# Patient Record
Sex: FEMALE | Race: WHITE | NOT HISPANIC OR LATINO | Employment: FULL TIME | URBAN - METROPOLITAN AREA
[De-identification: names, ages, dates, MRNs, and addresses within clinical notes are randomized per-mention and may not be internally consistent; named-entity substitution may affect disease eponyms.]

---

## 2019-10-15 ENCOUNTER — TELEPHONE (OUTPATIENT)
Dept: INTERNAL MEDICINE | Facility: CLINIC | Age: 24
End: 2019-10-15

## 2019-10-15 NOTE — TELEPHONE ENCOUNTER
Patient states she is a patient of  but is not wanting to schedule an appointment with him at this time to re-establish care, only wants to schedule a flu shot. Patient has scheduled a flu shot and will call back to schedule appointment with .

## 2019-10-15 NOTE — TELEPHONE ENCOUNTER
----- Message from Catia Espinoza sent at 10/15/2019 12:42 PM CDT -----  Contact: pt  Pt would like nurse to contact her regarding an appointment at ([phone])    Pt will be NEW PT!

## 2021-04-05 ENCOUNTER — LAB VISIT (OUTPATIENT)
Dept: LAB | Facility: HOSPITAL | Age: 26
End: 2021-04-05
Attending: FAMILY MEDICINE
Payer: COMMERCIAL

## 2021-04-05 ENCOUNTER — OFFICE VISIT (OUTPATIENT)
Dept: INTERNAL MEDICINE | Facility: CLINIC | Age: 26
End: 2021-04-05
Payer: COMMERCIAL

## 2021-04-05 VITALS
DIASTOLIC BLOOD PRESSURE: 70 MMHG | TEMPERATURE: 99 F | WEIGHT: 128.06 LBS | SYSTOLIC BLOOD PRESSURE: 100 MMHG | HEIGHT: 66 IN | HEART RATE: 53 BPM | OXYGEN SATURATION: 100 % | BODY MASS INDEX: 20.58 KG/M2

## 2021-04-05 DIAGNOSIS — Z76.89 ENCOUNTER TO ESTABLISH CARE: Primary | ICD-10-CM

## 2021-04-05 DIAGNOSIS — Z76.89 ENCOUNTER TO ESTABLISH CARE: ICD-10-CM

## 2021-04-05 DIAGNOSIS — Z87.898 HISTORY OF SEIZURE: ICD-10-CM

## 2021-04-05 DIAGNOSIS — T07.XXXA MULTIPLE EXCORIATIONS: ICD-10-CM

## 2021-04-05 LAB
25(OH)D3+25(OH)D2 SERPL-MCNC: 31 NG/ML (ref 30–96)
ALBUMIN SERPL BCP-MCNC: 4.3 G/DL (ref 3.5–5.2)
ALP SERPL-CCNC: 62 U/L (ref 55–135)
ALT SERPL W/O P-5'-P-CCNC: 14 U/L (ref 10–44)
ANION GAP SERPL CALC-SCNC: 6 MMOL/L (ref 8–16)
AST SERPL-CCNC: 20 U/L (ref 10–40)
BASOPHILS # BLD AUTO: 0.08 K/UL (ref 0–0.2)
BASOPHILS NFR BLD: 1.3 % (ref 0–1.9)
BILIRUB SERPL-MCNC: 1.2 MG/DL (ref 0.1–1)
BUN SERPL-MCNC: 12 MG/DL (ref 6–20)
CALCIUM SERPL-MCNC: 9.4 MG/DL (ref 8.7–10.5)
CHLORIDE SERPL-SCNC: 104 MMOL/L (ref 95–110)
CHOLEST SERPL-MCNC: 166 MG/DL (ref 120–199)
CHOLEST/HDLC SERPL: 2.3 {RATIO} (ref 2–5)
CO2 SERPL-SCNC: 28 MMOL/L (ref 23–29)
CREAT SERPL-MCNC: 0.8 MG/DL (ref 0.5–1.4)
DIFFERENTIAL METHOD: NORMAL
EOSINOPHIL # BLD AUTO: 0.1 K/UL (ref 0–0.5)
EOSINOPHIL NFR BLD: 2 % (ref 0–8)
ERYTHROCYTE [DISTWIDTH] IN BLOOD BY AUTOMATED COUNT: 12.4 % (ref 11.5–14.5)
EST. GFR  (AFRICAN AMERICAN): >60 ML/MIN/1.73 M^2
EST. GFR  (NON AFRICAN AMERICAN): >60 ML/MIN/1.73 M^2
GLUCOSE SERPL-MCNC: 87 MG/DL (ref 70–110)
HCT VFR BLD AUTO: 41.5 % (ref 37–48.5)
HDLC SERPL-MCNC: 71 MG/DL (ref 40–75)
HDLC SERPL: 42.8 % (ref 20–50)
HGB BLD-MCNC: 13.8 G/DL (ref 12–16)
IMM GRANULOCYTES # BLD AUTO: 0.02 K/UL (ref 0–0.04)
IMM GRANULOCYTES NFR BLD AUTO: 0.3 % (ref 0–0.5)
LDLC SERPL CALC-MCNC: 83.2 MG/DL (ref 63–159)
LYMPHOCYTES # BLD AUTO: 1.2 K/UL (ref 1–4.8)
LYMPHOCYTES NFR BLD: 19.2 % (ref 18–48)
MCH RBC QN AUTO: 29.9 PG (ref 27–31)
MCHC RBC AUTO-ENTMCNC: 33.3 G/DL (ref 32–36)
MCV RBC AUTO: 90 FL (ref 82–98)
MONOCYTES # BLD AUTO: 0.5 K/UL (ref 0.3–1)
MONOCYTES NFR BLD: 7.5 % (ref 4–15)
NEUTROPHILS # BLD AUTO: 4.3 K/UL (ref 1.8–7.7)
NEUTROPHILS NFR BLD: 69.7 % (ref 38–73)
NONHDLC SERPL-MCNC: 95 MG/DL
NRBC BLD-RTO: 0 /100 WBC
PLATELET # BLD AUTO: 310 K/UL (ref 150–450)
PMV BLD AUTO: 10.9 FL (ref 9.2–12.9)
POTASSIUM SERPL-SCNC: 3.7 MMOL/L (ref 3.5–5.1)
PROT SERPL-MCNC: 7.2 G/DL (ref 6–8.4)
RBC # BLD AUTO: 4.62 M/UL (ref 4–5.4)
SODIUM SERPL-SCNC: 138 MMOL/L (ref 136–145)
TRIGL SERPL-MCNC: 59 MG/DL (ref 30–150)
TSH SERPL DL<=0.005 MIU/L-ACNC: 1.4 UIU/ML (ref 0.4–4)
WBC # BLD AUTO: 6.13 K/UL (ref 3.9–12.7)

## 2021-04-05 PROCEDURE — 1126F AMNT PAIN NOTED NONE PRSNT: CPT | Mod: S$GLB,,, | Performed by: FAMILY MEDICINE

## 2021-04-05 PROCEDURE — 3008F PR BODY MASS INDEX (BMI) DOCUMENTED: ICD-10-PCS | Mod: CPTII,S$GLB,, | Performed by: FAMILY MEDICINE

## 2021-04-05 PROCEDURE — 80053 COMPREHEN METABOLIC PANEL: CPT | Performed by: FAMILY MEDICINE

## 2021-04-05 PROCEDURE — 86703 HIV-1/HIV-2 1 RESULT ANTBDY: CPT | Performed by: FAMILY MEDICINE

## 2021-04-05 PROCEDURE — 85025 COMPLETE CBC W/AUTO DIFF WBC: CPT | Performed by: FAMILY MEDICINE

## 2021-04-05 PROCEDURE — 36415 COLL VENOUS BLD VENIPUNCTURE: CPT | Performed by: FAMILY MEDICINE

## 2021-04-05 PROCEDURE — 99203 PR OFFICE/OUTPT VISIT, NEW, LEVL III, 30-44 MIN: ICD-10-PCS | Mod: S$GLB,,, | Performed by: FAMILY MEDICINE

## 2021-04-05 PROCEDURE — 82306 VITAMIN D 25 HYDROXY: CPT | Performed by: FAMILY MEDICINE

## 2021-04-05 PROCEDURE — 86803 HEPATITIS C AB TEST: CPT | Performed by: FAMILY MEDICINE

## 2021-04-05 PROCEDURE — 80061 LIPID PANEL: CPT | Performed by: FAMILY MEDICINE

## 2021-04-05 PROCEDURE — 99999 PR PBB SHADOW E&M-EST. PATIENT-LVL IV: ICD-10-PCS | Mod: PBBFAC,,, | Performed by: FAMILY MEDICINE

## 2021-04-05 PROCEDURE — 1126F PR PAIN SEVERITY QUANTIFIED, NO PAIN PRESENT: ICD-10-PCS | Mod: S$GLB,,, | Performed by: FAMILY MEDICINE

## 2021-04-05 PROCEDURE — 3008F BODY MASS INDEX DOCD: CPT | Mod: CPTII,S$GLB,, | Performed by: FAMILY MEDICINE

## 2021-04-05 PROCEDURE — 99203 OFFICE O/P NEW LOW 30 MIN: CPT | Mod: S$GLB,,, | Performed by: FAMILY MEDICINE

## 2021-04-05 PROCEDURE — 99999 PR PBB SHADOW E&M-EST. PATIENT-LVL IV: CPT | Mod: PBBFAC,,, | Performed by: FAMILY MEDICINE

## 2021-04-05 PROCEDURE — 84443 ASSAY THYROID STIM HORMONE: CPT | Performed by: FAMILY MEDICINE

## 2021-04-05 RX ORDER — OXCARBAZEPINE 600 MG/1
1 TABLET ORAL
COMMUNITY
Start: 2021-02-03

## 2021-04-06 LAB
HCV AB SERPL QL IA: NEGATIVE
HIV 1+2 AB+HIV1 P24 AG SERPL QL IA: NEGATIVE

## 2021-04-20 ENCOUNTER — OFFICE VISIT (OUTPATIENT)
Dept: DERMATOLOGY | Facility: CLINIC | Age: 26
End: 2021-04-20
Payer: COMMERCIAL

## 2021-04-20 ENCOUNTER — PATIENT OUTREACH (OUTPATIENT)
Dept: ADMINISTRATIVE | Facility: HOSPITAL | Age: 26
End: 2021-04-20

## 2021-04-20 DIAGNOSIS — T07.XXXA MULTIPLE EXCORIATIONS: ICD-10-CM

## 2021-04-20 DIAGNOSIS — L70.5 ACNE EXCORIEE: ICD-10-CM

## 2021-04-20 DIAGNOSIS — L70.0 ACNE VULGARIS: Primary | ICD-10-CM

## 2021-04-20 PROCEDURE — 99204 PR OFFICE/OUTPT VISIT, NEW, LEVL IV, 45-59 MIN: ICD-10-PCS | Mod: S$GLB,,, | Performed by: DERMATOLOGY

## 2021-04-20 PROCEDURE — 99999 PR PBB SHADOW E&M-EST. PATIENT-LVL III: CPT | Mod: PBBFAC,,, | Performed by: DERMATOLOGY

## 2021-04-20 PROCEDURE — 99204 OFFICE O/P NEW MOD 45 MIN: CPT | Mod: S$GLB,,, | Performed by: DERMATOLOGY

## 2021-04-20 PROCEDURE — 99999 PR PBB SHADOW E&M-EST. PATIENT-LVL III: ICD-10-PCS | Mod: PBBFAC,,, | Performed by: DERMATOLOGY

## 2021-04-20 RX ORDER — TRETINOIN 0.25 MG/G
CREAM TOPICAL NIGHTLY
Qty: 20 G | Refills: 2 | Status: SHIPPED | OUTPATIENT
Start: 2021-04-20 | End: 2021-08-24 | Stop reason: SDUPTHER

## 2021-04-29 ENCOUNTER — PATIENT OUTREACH (OUTPATIENT)
Dept: ADMINISTRATIVE | Facility: HOSPITAL | Age: 26
End: 2021-04-29

## 2021-08-16 ENCOUNTER — TELEPHONE (OUTPATIENT)
Dept: DERMATOLOGY | Facility: CLINIC | Age: 26
End: 2021-08-16

## 2021-08-24 ENCOUNTER — OFFICE VISIT (OUTPATIENT)
Dept: DERMATOLOGY | Facility: CLINIC | Age: 26
End: 2021-08-24
Payer: COMMERCIAL

## 2021-08-24 DIAGNOSIS — L73.0 ACNE SCARRING: ICD-10-CM

## 2021-08-24 DIAGNOSIS — L70.0 ACNE VULGARIS: Primary | ICD-10-CM

## 2021-08-24 DIAGNOSIS — L81.9 POST-INFLAMMATORY PIGMENTARY CHANGES: ICD-10-CM

## 2021-08-24 DIAGNOSIS — L70.5 ACNE EXCORIEE: ICD-10-CM

## 2021-08-24 PROCEDURE — 1159F MED LIST DOCD IN RCRD: CPT | Mod: CPTII,S$GLB,, | Performed by: DERMATOLOGY

## 2021-08-24 PROCEDURE — 99214 PR OFFICE/OUTPT VISIT, EST, LEVL IV, 30-39 MIN: ICD-10-PCS | Mod: S$GLB,,, | Performed by: DERMATOLOGY

## 2021-08-24 PROCEDURE — 99999 PR PBB SHADOW E&M-EST. PATIENT-LVL III: CPT | Mod: PBBFAC,,, | Performed by: DERMATOLOGY

## 2021-08-24 PROCEDURE — 1160F RVW MEDS BY RX/DR IN RCRD: CPT | Mod: CPTII,S$GLB,, | Performed by: DERMATOLOGY

## 2021-08-24 PROCEDURE — 1160F PR REVIEW ALL MEDS BY PRESCRIBER/CLIN PHARMACIST DOCUMENTED: ICD-10-PCS | Mod: CPTII,S$GLB,, | Performed by: DERMATOLOGY

## 2021-08-24 PROCEDURE — 99214 OFFICE O/P EST MOD 30 MIN: CPT | Mod: S$GLB,,, | Performed by: DERMATOLOGY

## 2021-08-24 PROCEDURE — 99999 PR PBB SHADOW E&M-EST. PATIENT-LVL III: ICD-10-PCS | Mod: PBBFAC,,, | Performed by: DERMATOLOGY

## 2021-08-24 PROCEDURE — 1159F PR MEDICATION LIST DOCUMENTED IN MEDICAL RECORD: ICD-10-PCS | Mod: CPTII,S$GLB,, | Performed by: DERMATOLOGY

## 2021-08-24 RX ORDER — SULFACETAMIDE SODIUM, SULFUR 100; 50 MG/G; MG/G
EMULSION TOPICAL
Qty: 360 G | Refills: 3 | Status: SHIPPED | OUTPATIENT
Start: 2021-08-24 | End: 2022-06-02 | Stop reason: SDUPTHER

## 2021-08-24 RX ORDER — TRETINOIN 0.25 MG/G
CREAM TOPICAL NIGHTLY
Qty: 20 G | Refills: 5 | Status: SHIPPED | OUTPATIENT
Start: 2021-08-24 | End: 2022-06-02 | Stop reason: SDUPTHER

## 2021-09-04 ENCOUNTER — PATIENT MESSAGE (OUTPATIENT)
Dept: INTERNAL MEDICINE | Facility: CLINIC | Age: 26
End: 2021-09-04

## 2021-12-01 ENCOUNTER — OFFICE VISIT (OUTPATIENT)
Dept: DERMATOLOGY | Facility: CLINIC | Age: 26
End: 2021-12-01
Payer: COMMERCIAL

## 2021-12-01 DIAGNOSIS — L81.0 POSTINFLAMMATORY HYPERPIGMENTATION: ICD-10-CM

## 2021-12-01 DIAGNOSIS — L73.0 ACNE SCARRING: Primary | ICD-10-CM

## 2021-12-01 PROCEDURE — 99213 OFFICE O/P EST LOW 20 MIN: CPT | Mod: S$GLB,,, | Performed by: STUDENT IN AN ORGANIZED HEALTH CARE EDUCATION/TRAINING PROGRAM

## 2021-12-01 PROCEDURE — 99213 PR OFFICE/OUTPT VISIT, EST, LEVL III, 20-29 MIN: ICD-10-PCS | Mod: S$GLB,,, | Performed by: STUDENT IN AN ORGANIZED HEALTH CARE EDUCATION/TRAINING PROGRAM

## 2021-12-01 PROCEDURE — 99999 PR PBB SHADOW E&M-EST. PATIENT-LVL III: ICD-10-PCS | Mod: PBBFAC,,, | Performed by: STUDENT IN AN ORGANIZED HEALTH CARE EDUCATION/TRAINING PROGRAM

## 2021-12-01 PROCEDURE — 99999 PR PBB SHADOW E&M-EST. PATIENT-LVL III: CPT | Mod: PBBFAC,,, | Performed by: STUDENT IN AN ORGANIZED HEALTH CARE EDUCATION/TRAINING PROGRAM

## 2021-12-08 ENCOUNTER — OFFICE VISIT (OUTPATIENT)
Dept: INTERNAL MEDICINE | Facility: CLINIC | Age: 26
End: 2021-12-08
Payer: COMMERCIAL

## 2021-12-08 VITALS
OXYGEN SATURATION: 100 % | HEIGHT: 66 IN | BODY MASS INDEX: 20.76 KG/M2 | SYSTOLIC BLOOD PRESSURE: 104 MMHG | HEART RATE: 81 BPM | DIASTOLIC BLOOD PRESSURE: 70 MMHG | WEIGHT: 129.19 LBS | TEMPERATURE: 96 F

## 2021-12-08 DIAGNOSIS — F41.9 ANXIETY: Primary | ICD-10-CM

## 2021-12-08 PROCEDURE — 99999 PR PBB SHADOW E&M-EST. PATIENT-LVL III: CPT | Mod: PBBFAC,,, | Performed by: FAMILY MEDICINE

## 2021-12-08 PROCEDURE — 99999 PR PBB SHADOW E&M-EST. PATIENT-LVL III: ICD-10-PCS | Mod: PBBFAC,,, | Performed by: FAMILY MEDICINE

## 2021-12-08 PROCEDURE — 99213 OFFICE O/P EST LOW 20 MIN: CPT | Mod: S$GLB,,, | Performed by: FAMILY MEDICINE

## 2021-12-08 PROCEDURE — 99213 PR OFFICE/OUTPT VISIT, EST, LEVL III, 20-29 MIN: ICD-10-PCS | Mod: S$GLB,,, | Performed by: FAMILY MEDICINE

## 2021-12-08 RX ORDER — ESCITALOPRAM OXALATE 10 MG/1
10 TABLET ORAL DAILY
Qty: 90 TABLET | Refills: 1 | Status: SHIPPED | OUTPATIENT
Start: 2021-12-08 | End: 2022-02-01 | Stop reason: DRUGHIGH

## 2022-02-01 ENCOUNTER — OFFICE VISIT (OUTPATIENT)
Dept: INTERNAL MEDICINE | Facility: CLINIC | Age: 27
End: 2022-02-01
Payer: COMMERCIAL

## 2022-02-01 DIAGNOSIS — F41.9 ANXIETY: Primary | ICD-10-CM

## 2022-02-01 PROCEDURE — 99213 PR OFFICE/OUTPT VISIT, EST, LEVL III, 20-29 MIN: ICD-10-PCS | Mod: 95,,, | Performed by: FAMILY MEDICINE

## 2022-02-01 PROCEDURE — 99213 OFFICE O/P EST LOW 20 MIN: CPT | Mod: 95,,, | Performed by: FAMILY MEDICINE

## 2022-02-01 RX ORDER — ESCITALOPRAM OXALATE 20 MG/1
20 TABLET ORAL DAILY
Qty: 90 TABLET | Refills: 1 | Status: SHIPPED | OUTPATIENT
Start: 2022-02-01 | End: 2022-08-01

## 2022-02-01 NOTE — PROGRESS NOTES
Subjective:       Patient ID: Tammie Siddiqui is a 26 y.o. female.    Chief Complaint: No chief complaint on file.    HPI    The patient location is: home  The chief complaint leading to consultation is: anxiety    Visit type: audiovisual    Face to Face time with patient: 10   minutes of total time spent on the encounter, which includes face to face time and non-face to face time preparing to see the patient (eg, review of tests), Obtaining and/or reviewing separately obtained history, Documenting clinical information in the electronic or other health record, Independently interpreting results (not separately reported) and communicating results to the patient/family/caregiver, or Care coordination (not separately reported).         Each patient to whom he or she provides medical services by telemedicine is:  (1) informed of the relationship between the physician and patient and the respective role of any other health care provider with respect to management of the patient; and (2) notified that he or she may decline to receive medical services by telemedicine and may withdraw from such care at any time.    Notes:       There is no problem list on file for this patient.      Past Medical History:   Diagnosis Date    Anxiety     History of other drug therapy 8/24/2021 10:17:32 AM    [facility] [place] Historical - Unknown: COVID-19 vaccine series completed-No Additional Notes    History of other drug therapy 8/24/2021 10:17:32 AM    [facility] Historical - Unknown: COVID-19 vaccine series completed-No Additional Notes    Migraine     Other specified viral infection, in conditions classified elsewhere and of unspecified site 8/24/2021 10:17:21 AM    [facility] [place] Historical - Unknown: COVID-19-No Additional Notes    Other specified viral infection, in conditions classified elsewhere and of unspecified site 8/24/2021 10:17:21 AM    [facility] [place] Historical - Unknown: COVID-19-No Additional Notes    Seizures         Past Surgical History:   Procedure Laterality Date    TONSILLECTOMY      WISDOM TOOTH EXTRACTION         Family History   Problem Relation Age of Onset    Dementia Maternal Grandmother         alzheimer's    Heart disease Paternal Grandfather     Pancreatic cancer Paternal Uncle     Seizures Cousin        Social History     Tobacco Use   Smoking Status Never Smoker   Smokeless Tobacco Never Used       Wt Readings from Last 5 Encounters:   12/08/21 58.6 kg (129 lb 3 oz)   04/05/21 58.1 kg (128 lb 1.4 oz)       For further HPI details, see assessment and plan.    Review of Systems   Constitutional: Negative for activity change and unexpected weight change.   HENT: Negative for hearing loss, rhinorrhea and trouble swallowing.    Eyes: Negative for discharge and visual disturbance.   Respiratory: Negative for chest tightness and wheezing.    Cardiovascular: Negative for chest pain and palpitations.   Gastrointestinal: Negative for blood in stool, constipation, diarrhea and vomiting.   Endocrine: Negative for polydipsia and polyuria.   Genitourinary: Negative for difficulty urinating, dysuria, hematuria and menstrual problem.   Musculoskeletal: Positive for neck pain. Negative for arthralgias and joint swelling.   Neurological: Negative for weakness and headaches.   Psychiatric/Behavioral: Negative for confusion and dysphoric mood.       Objective:      There were no vitals filed for this visit.    Physical Exam  Constitutional:       General: She is not in acute distress.     Appearance: She is not ill-appearing.   Pulmonary:      Effort: Pulmonary effort is normal. No respiratory distress.   Neurological:      General: No focal deficit present.      Mental Status: She is alert.   Psychiatric:         Mood and Affect: Mood normal.         Behavior: Behavior normal.         Assessment:       1. Anxiety        Plan:   Anxiety    Other orders  -     EScitalopram oxalate (LEXAPRO) 20 MG tablet; Take 1 tablet (20  mg total) by mouth once daily.  Dispense: 90 tablet; Refill: 1        Anxiety  No noticing a change    Generalized anxiety disorder  Patient has been taking 10 mg of Lexapro for the past 2 months.  She has had no positive or negative side effects.  She has tolerated the medication well but she is concerned that she has had no significant improvement in regards to her anxiety.  She had previously taken 5 mg and have the same effect.  Today we will increase her dose from 10-20 mg. I will is arrange for another video visit in roughly 2 months to assess for improvement.  She has noted about 5 lb of weight gain that she can appreciate.  She is uncertain if this is medication induced or poor lifestyle choices she has been making with lack of exercise and diet issues.  She will attempt to improve her lifestyle efforts and monitor for continued weight gain with the medication.      This note was verbally dictated, please excuse any type errors.

## 2022-06-01 RX ORDER — ESCITALOPRAM OXALATE 10 MG/1
TABLET ORAL
Qty: 90 TABLET | Refills: 1 | OUTPATIENT
Start: 2022-06-01

## 2022-06-01 NOTE — TELEPHONE ENCOUNTER
No new care gaps identified.  [facility] Embedded Care Gaps. Reference number: [id number]. 6/01/2022   12:11:51 AM AUDIT

## 2022-06-01 NOTE — TELEPHONE ENCOUNTER
Quick DC. Inappropriate Request    Refill Authorization Note   Tammie Siddiqui  is requesting a refill authorization.  Brief Assessment and Rationale for Refill:  Quick Discontinue  Medication Therapy Plan:  PCP increased to 20mg on 2/1/22    Medication Reconciliation Completed:  No      Comments:     Note composed:8:22 AM 06/01/2022

## 2022-06-02 DIAGNOSIS — L70.0 ACNE VULGARIS: ICD-10-CM

## 2022-06-07 ENCOUNTER — PATIENT MESSAGE (OUTPATIENT)
Dept: DERMATOLOGY | Facility: CLINIC | Age: 27
End: 2022-06-07
Payer: COMMERCIAL

## 2022-06-07 RX ORDER — SULFACETAMIDE SODIUM, SULFUR 100; 50 MG/G; MG/G
EMULSION TOPICAL
Qty: 360 G | Refills: 3 | Status: SHIPPED | OUTPATIENT
Start: 2022-06-07 | End: 2022-08-31

## 2022-06-07 RX ORDER — TRETINOIN 0.25 MG/G
CREAM TOPICAL NIGHTLY
Qty: 20 G | Refills: 5 | Status: SHIPPED | OUTPATIENT
Start: 2022-06-07 | End: 2023-02-27

## 2022-07-08 ENCOUNTER — OFFICE VISIT (OUTPATIENT)
Dept: DERMATOLOGY | Facility: CLINIC | Age: 27
End: 2022-07-08
Payer: COMMERCIAL

## 2022-07-08 ENCOUNTER — LAB VISIT (OUTPATIENT)
Dept: LAB | Facility: HOSPITAL | Age: 27
End: 2022-07-08
Attending: INTERNAL MEDICINE
Payer: COMMERCIAL

## 2022-07-08 DIAGNOSIS — L81.9 POST-INFLAMMATORY PIGMENTARY CHANGES: ICD-10-CM

## 2022-07-08 DIAGNOSIS — L70.5 ACNE EXCORIEE: ICD-10-CM

## 2022-07-08 DIAGNOSIS — L70.0 ACNE VULGARIS: ICD-10-CM

## 2022-07-08 DIAGNOSIS — D22.9 MULTIPLE BENIGN NEVI: ICD-10-CM

## 2022-07-08 DIAGNOSIS — L65.9 ALOPECIA: Primary | ICD-10-CM

## 2022-07-08 DIAGNOSIS — L65.9 ALOPECIA: ICD-10-CM

## 2022-07-08 LAB
BASOPHILS # BLD AUTO: 0.09 K/UL (ref 0–0.2)
BASOPHILS NFR BLD: 1.5 % (ref 0–1.9)
DIFFERENTIAL METHOD: NORMAL
EOSINOPHIL # BLD AUTO: 0.1 K/UL (ref 0–0.5)
EOSINOPHIL NFR BLD: 2.1 % (ref 0–8)
ERYTHROCYTE [DISTWIDTH] IN BLOOD BY AUTOMATED COUNT: 12.2 % (ref 11.5–14.5)
FERRITIN SERPL-MCNC: 10 NG/ML (ref 20–300)
HCT VFR BLD AUTO: 41.2 % (ref 37–48.5)
HGB BLD-MCNC: 13.3 G/DL (ref 12–16)
IMM GRANULOCYTES # BLD AUTO: 0.01 K/UL (ref 0–0.04)
IMM GRANULOCYTES NFR BLD AUTO: 0.2 % (ref 0–0.5)
LYMPHOCYTES # BLD AUTO: 1.3 K/UL (ref 1–4.8)
LYMPHOCYTES NFR BLD: 21.1 % (ref 18–48)
MCH RBC QN AUTO: 29.9 PG (ref 27–31)
MCHC RBC AUTO-ENTMCNC: 32.3 G/DL (ref 32–36)
MCV RBC AUTO: 93 FL (ref 82–98)
MONOCYTES # BLD AUTO: 0.6 K/UL (ref 0.3–1)
MONOCYTES NFR BLD: 8.9 % (ref 4–15)
NEUTROPHILS # BLD AUTO: 4.1 K/UL (ref 1.8–7.7)
NEUTROPHILS NFR BLD: 66.2 % (ref 38–73)
NRBC BLD-RTO: 0 /100 WBC
PLATELET # BLD AUTO: 293 K/UL (ref 150–450)
PMV BLD AUTO: 11.9 FL (ref 9.2–12.9)
RBC # BLD AUTO: 4.45 M/UL (ref 4–5.4)
TSH SERPL DL<=0.005 MIU/L-ACNC: 1.48 UIU/ML (ref 0.4–4)
WBC # BLD AUTO: 6.16 K/UL (ref 3.9–12.7)

## 2022-07-08 PROCEDURE — 84443 ASSAY THYROID STIM HORMONE: CPT | Performed by: DERMATOLOGY

## 2022-07-08 PROCEDURE — 85025 COMPLETE CBC W/AUTO DIFF WBC: CPT | Performed by: DERMATOLOGY

## 2022-07-08 PROCEDURE — 99999 PR PBB SHADOW E&M-EST. PATIENT-LVL III: ICD-10-PCS | Mod: PBBFAC,,, | Performed by: DERMATOLOGY

## 2022-07-08 PROCEDURE — 1159F PR MEDICATION LIST DOCUMENTED IN MEDICAL RECORD: ICD-10-PCS | Mod: CPTII,S$GLB,, | Performed by: DERMATOLOGY

## 2022-07-08 PROCEDURE — 99214 PR OFFICE/OUTPT VISIT, EST, LEVL IV, 30-39 MIN: ICD-10-PCS | Mod: S$GLB,,, | Performed by: DERMATOLOGY

## 2022-07-08 PROCEDURE — 99214 OFFICE O/P EST MOD 30 MIN: CPT | Mod: S$GLB,,, | Performed by: DERMATOLOGY

## 2022-07-08 PROCEDURE — 82728 ASSAY OF FERRITIN: CPT | Performed by: DERMATOLOGY

## 2022-07-08 PROCEDURE — 1159F MED LIST DOCD IN RCRD: CPT | Mod: CPTII,S$GLB,, | Performed by: DERMATOLOGY

## 2022-07-08 PROCEDURE — 1160F PR REVIEW ALL MEDS BY PRESCRIBER/CLIN PHARMACIST DOCUMENTED: ICD-10-PCS | Mod: CPTII,S$GLB,, | Performed by: DERMATOLOGY

## 2022-07-08 PROCEDURE — 82306 VITAMIN D 25 HYDROXY: CPT | Performed by: DERMATOLOGY

## 2022-07-08 PROCEDURE — 99999 PR PBB SHADOW E&M-EST. PATIENT-LVL III: CPT | Mod: PBBFAC,,, | Performed by: DERMATOLOGY

## 2022-07-08 PROCEDURE — 1160F RVW MEDS BY RX/DR IN RCRD: CPT | Mod: CPTII,S$GLB,, | Performed by: DERMATOLOGY

## 2022-07-08 PROCEDURE — 84630 ASSAY OF ZINC: CPT | Performed by: DERMATOLOGY

## 2022-07-08 RX ORDER — KETOCONAZOLE 20 MG/ML
SHAMPOO, SUSPENSION TOPICAL
Qty: 120 ML | Refills: 5 | Status: SHIPPED | OUTPATIENT
Start: 2022-07-08 | End: 2023-08-29 | Stop reason: ALTCHOICE

## 2022-07-08 NOTE — PROGRESS NOTES
Subjective:       Patient ID:  Tammie Siddiqui is a 26 y.o. female who presents for   H/o acne, acne excoriee, last seen 12/1/21 by Dr. Colmenares for acne scarring and PIPA. Rx'd skin lightening cream from [facility] and is having microneedling done at [facility].  Requests refill of the lightening cream - only used it for 6 weeks and noted improvement, has been off several months now.  Wearing daily tinted SPF.  Requests refill of sulfur wash, will try [facility] (insurance stopped covering it).  Tolerating tretinoin nightly, does not need a refill yet.      Requests skin cancer screening; no concerning lesions; no personal/fam h/o skin cancer.     Today also c/o hair loss:  Location: entire scalp  Duration: 3 months  Symptoms: diffuse shedding; no pain/itching/burning/scaling/rash; no hair loss elsewhere on body  Relieving factors/Previous treatments: none    Cannot ID any trigger    Denies recent surgery, illness, anesthesia, hospitalization, high fever, childbirth   Denies recent weight loss/crash diet  Denies recent major life stressor/event  Denies recent start/stop of birth control  Denies history of high tension hairstyles, chemical hair treatment/perm/extensions/weaves  Denies family history of hair loss    + anticonvulsant (oxcarbazepine - has been on for years)  + antidepressant/anti anxiolytic (lexapro - newer; started more than 6 months prior to when the hair shedding started)    Denies the following medications/medication changes:  discontinuation of oral contraceptives  retinoids (acitretin, isotretinoin) and vitamin A excess  anticoagulants (especially heparin)  antithyroid (propylthiouracil, methimazole)  Antihyperlipidemic drugs (statins)  Allopurinol  albendazole  Beta blockers  Bromocriptine  Captopril  Cimetidine  sulfasalazine  interferon-a-2b  heavy metals  Chemotherapeutic agents            Initial eval HPI from 4/20/21:  History of Present Illness: The patient presents with  "chief complaint of acne and scarring  Location: face (worse on R lower cheek and forehead)  Duration: 3-4 months  Signs/Symptoms: pus bumps; admits to picking and leaving scars; does not think it worsens with menstrual periods    Prior treatments:   Moisturizer  Vit C oil  facials    Washes with Clearasil - possibly medicated  Neutrogena moisturizer at night  Using the hydrocolloid "mighty patch"      H/o anxiety, seizures, migraine    Meds:   Oxcarbazepine    Method of contraception:  paraguard IUD (non hormonal)  Family planning: no plans for conception    History of depression: denies  History of IBD: denies  History of migraines: yes, when younger, none within the past year      Personal history of kidney problems: denies  Personal history of HTN: denies  Personal hx of breast/uterine cancer: denies  Family hx of breast/uterine cancer: denies            Review of Systems   Constitutional: Negative for fever, weight loss and night sweats.   Gastrointestinal: Negative for diarrhea and constipation.   Skin: Positive for daily sunscreen use. Negative for itching and rash.   Endocrine: Negative for cold intolerance and heat intolerance.        Objective:    Physical Exam   Constitutional: She appears well-developed and well-nourished. No distress.   Neurological: She is alert and oriented to person, place, and time. She is not disoriented.   Psychiatric: She has a normal mood and affect.   Skin:   Areas Examined (abnormalities noted in diagram):   Scalp / Hair Palpated and Inspected  Head / Face Inspection Performed  Neck Inspection Performed  Chest / Axilla Inspection Performed  Abdomen Inspection Performed  Back Inspection Performed  RUE Inspected  LUE Inspection Performed  RLE Inspected  LLE Inspection Performed  Nails and Digits Inspection Performed                   Diagram Legend     Erythematous scaling macule/papule c/w actinic keratosis       Vascular papule c/w angioma      Pigmented verrucoid papule/plaque " c/w seborrheic keratosis      Yellow umbilicated papule c/w sebaceous hyperplasia      Irregularly shaped tan macule c/w lentigo     1-2 mm smooth white papules consistent with Milia      Movable subcutaneous cyst with punctum c/w epidermal inclusion cyst      Subcutaneous movable cyst c/w pilar cyst      Firm pink to brown papule c/w dermatofibroma      Pedunculated fleshy papule(s) c/w skin tag(s)      Evenly pigmented macule c/w junctional nevus     Mildly variegated pigmented, slightly irregular-bordered macule c/w mildly atypical nevus      Flesh colored to evenly pigmented papule c/w intradermal nevus       Pink pearly papule/plaque c/w basal cell carcinoma      Erythematous hyperkeratotic cursted plaque c/w SCC      Surgical scar with no sign of skin cancer recurrence      Open and closed comedones      Inflammatory papules and pustules      Verrucoid papule consistent consistent with wart     Erythematous eczematous patches and plaques     Dystrophic onycholytic nail with subungual debris c/w onychomycosis     Umbilicated papule    Erythematous-base heme-crusted tan verrucoid plaque consistent with inflamed seborrheic keratosis     Erythematous Silvery Scaling Plaque c/w Psoriasis     See annotation      Assessment / Plan:        Alopecia  - most consistent with TE, but no identifying trigger other than potentially new medication (lexapro). Discussed suspected etiology, management. She would like labs checked (below). Keto shampoo. Discussed OTC Rogaine.  -     Vitamin D; Future; Expected date: 07/08/2022  -     Zinc; Future; Expected date: 07/08/2022  -     FERRITIN; Future; Expected date: 07/08/2022  -     TSH; Future; Expected date: 07/08/2022  -     CBC Auto Differential; Future; Expected date: 07/08/2022  -     ketoconazole (NIZORAL) 2 % shampoo; Wash hair with medicated shampoo at least 2x/week - let sit on scalp at least 5 minutes prior to rinsing  Dispense: 120 mL; Refill: 5    Acne vulgaris  Acne  excoriee  Post-inflammatory pigmentary changes  - acne is controlled. Continue current regimen (she will message if she needs refill of tretinoin).  Will send [facility] sulfur wash and will check with Dr. Colmenares to see which lightening formulation she was using and send refill - discussed limiting use and taking breaks.     - topical retinoid: we reviewed that a pea sized amount of the topical retinoid is to be applied to the entire face at night and that it is not spot treatment. Patient to use every other night or 3 nights a week and gradually increase to goal of nightly use (as tolerated). Side effects reviewed to include but not limited to redness, dryness, flaking, burning/tingling sensation, skin irritation, and feeling of tightness. We reviewed that this is not to be used if pregnant.  Recommended discontinuing use for one week prior to waxing.    - hydroquinone: we reviewed that this is to be used for no more than 2-3 months of continuous use, and then take at least one month off. Discussed risk of ochronosis with uninterrupted use.    Continue daily tinted SPF    Multiple benign nevi  Total body skin examination performed today including at least 10 points as noted in physical examination. No lesions suspicious for malignancy noted.  Reassurance provided.  Instructed patient to observe lesion(s) for changes and follow up in clinic if changes are noted. Discussed ABCDE's of moles.               Follow up in about 1 year (around 7/8/2023) for repeat skin check, f/u in 3 months for hair loss if shedding is ongoing.

## 2022-07-09 LAB — 25(OH)D3+25(OH)D2 SERPL-MCNC: 33 NG/ML (ref 30–96)

## 2022-07-11 ENCOUNTER — PATIENT MESSAGE (OUTPATIENT)
Dept: DERMATOLOGY | Facility: CLINIC | Age: 27
End: 2022-07-11
Payer: COMMERCIAL

## 2022-07-11 LAB — ZINC SERPL-MCNC: 42 UG/DL (ref 60–130)

## 2022-07-11 NOTE — PROGRESS NOTES
Informed pt via portal of hair loss lab results. TSH and CBC wnl. Gave recommendations for supplementing vitamin D, zinc, and iron.    Ferritin <10  Vit D 33  Zinc 42

## 2022-08-01 RX ORDER — ESCITALOPRAM OXALATE 20 MG/1
TABLET ORAL
Qty: 90 TABLET | Refills: 1 | Status: SHIPPED | OUTPATIENT
Start: 2022-08-01 | End: 2023-02-28

## 2022-08-01 NOTE — TELEPHONE ENCOUNTER
Refill Decision Note   Tammie Mcdonnellichaux  is requesting a refill authorization.  Brief Assessment and Rationale for Refill:  Approve     Medication Therapy Plan:       Medication Reconciliation Completed: No   Comments:     No Care Gaps recommended.     Note composed:1:40 PM 08/01/2022

## 2023-02-28 ENCOUNTER — OFFICE VISIT (OUTPATIENT)
Dept: INTERNAL MEDICINE | Facility: CLINIC | Age: 28
End: 2023-02-28
Payer: COMMERCIAL

## 2023-02-28 DIAGNOSIS — F41.9 ANXIETY: Primary | ICD-10-CM

## 2023-02-28 PROCEDURE — 99213 PR OFFICE/OUTPT VISIT, EST, LEVL III, 20-29 MIN: ICD-10-PCS | Mod: 95,,, | Performed by: FAMILY MEDICINE

## 2023-02-28 PROCEDURE — 99213 OFFICE O/P EST LOW 20 MIN: CPT | Mod: 95,,, | Performed by: FAMILY MEDICINE

## 2023-02-28 RX ORDER — ESCITALOPRAM OXALATE 10 MG/1
10 TABLET ORAL DAILY
Qty: 30 TABLET | Refills: 0 | Status: SHIPPED | OUTPATIENT
Start: 2023-02-28 | End: 2023-03-22 | Stop reason: SDUPTHER

## 2023-02-28 NOTE — PROGRESS NOTES
Subjective:       Patient ID: Tammie Siddiqui is a 27 y.o. female.    Chief Complaint: No chief complaint on file.    HPI    The patient location is: home  The chief complaint leading to consultation is: anxiety med discussion    Visit type: audiovisual    Face to Face time with patient: 5-10   minutes of total time spent on the encounter, which includes face to face time and non-face to face time preparing to see the patient (eg, review of tests), Obtaining and/or reviewing separately obtained history, Documenting clinical information in the electronic or other health record, Independently interpreting results (not separately reported) and communicating results to the patient/family/caregiver, or Care coordination (not separately reported).         Each patient to whom he or she provides medical services by telemedicine is:  (1) informed of the relationship between the physician and patient and the respective role of any other health care provider with respect to management of the patient; and (2) notified that he or she may decline to receive medical services by telemedicine and may withdraw from such care at any time.    Notes:       Past Medical History:   Diagnosis Date    Anxiety     Epilepsy     History of abnormal cervical Pap smear     Migraine        Past Surgical History:   Procedure Laterality Date    TONSILLECTOMY      WISDOM TOOTH EXTRACTION         Family History   Problem Relation Age of Onset    Dementia Maternal Grandmother         alzheimer's    Heart disease Paternal Grandfather     Pancreatic cancer Paternal Uncle     Seizures Cousin        Social History     Tobacco Use   Smoking Status Never   Smokeless Tobacco Never       Wt Readings from Last 5 Encounters:   08/31/22 59 kg (130 lb)   12/08/21 58.6 kg (129 lb 3 oz)   04/05/21 58.1 kg (128 lb 1.4 oz)       For further HPI details, see assessment and plan.    Review of Systems   Constitutional:  Negative for activity change and unexpected  weight change.   HENT:  Negative for hearing loss, rhinorrhea and trouble swallowing.    Eyes:  Negative for discharge and visual disturbance.   Respiratory:  Negative for chest tightness and wheezing.    Cardiovascular:  Negative for chest pain and palpitations.   Gastrointestinal:  Negative for blood in stool, constipation, diarrhea and vomiting.   Endocrine: Negative for polydipsia and polyuria.   Genitourinary:  Negative for difficulty urinating, dysuria, hematuria and menstrual problem.   Musculoskeletal:  Negative for arthralgias, joint swelling and neck pain.   Neurological:  Negative for weakness and headaches.   Psychiatric/Behavioral:  Negative for confusion and dysphoric mood.      Objective:      There were no vitals filed for this visit.    Physical Exam  Constitutional:       General: She is not in acute distress.     Appearance: She is not ill-appearing.   Pulmonary:      Effort: Pulmonary effort is normal. No respiratory distress.   Neurological:      General: No focal deficit present.      Mental Status: She is alert.   Psychiatric:         Mood and Affect: Mood normal.         Behavior: Behavior normal.       Assessment:       1. Anxiety        Plan:   Anxiety    Other orders  -     EScitalopram oxalate (LEXAPRO) 10 MG tablet; Take 1 tablet (10 mg total) by mouth once daily.  Dispense: 30 tablet; Refill: 0        Anxiety  Patient is on Lexapro 20 mg.  She is interested in coming off the medication.  She feels her anxiety is controlled she is been feeling great.  She is been struggling with a few lb and she is curious if the SSRIs contributing.  Quite reasonable to try wean down process.  We will start this process by reducing her dose to 10 mg.  Encouraged she does at for roughly about a week.  Then 5 mg.  Then try every other day.  Discussed slow nature titration down and weaning off.      I do as she updates me if she has any problems during this process.      It has been nearly 2 years since we  have done blood work on her.      I would like to see her in roughly 3-6 months for an annual exam and checkup.  Sooner if she needs anything.    This note was verbally dictated, please excuse any type errors.

## 2023-07-04 NOTE — TELEPHONE ENCOUNTER
No care due was identified.  [facility] Embedded Care Due Messages. Reference number: [id number].   7/04/2023 11:38:48 AM CDT

## 2023-07-05 RX ORDER — ESCITALOPRAM OXALATE 10 MG/1
10 TABLET ORAL DAILY
Qty: 90 TABLET | Refills: 2 | Status: SHIPPED | OUTPATIENT
Start: 2023-07-05 | End: 2023-09-16 | Stop reason: SDUPTHER

## 2023-07-05 NOTE — TELEPHONE ENCOUNTER
Refill Decision Note   Tammie Mcdonnellichaux  is requesting a refill authorization.  Brief Assessment and Rationale for Refill:  Approve     Medication Therapy Plan:       Medication Reconciliation Completed: No   Comments:     No Care Gaps recommended.     Note composed:8:51 AM 07/05/2023

## 2023-08-29 ENCOUNTER — OFFICE VISIT (OUTPATIENT)
Dept: INTERNAL MEDICINE | Facility: CLINIC | Age: 28
End: 2023-08-29
Payer: COMMERCIAL

## 2023-08-29 ENCOUNTER — HOSPITAL ENCOUNTER (OUTPATIENT)
Dept: RADIOLOGY | Facility: HOSPITAL | Age: 28
Discharge: HOME | End: 2023-08-29
Attending: FAMILY MEDICINE
Payer: COMMERCIAL

## 2023-08-29 VITALS
RESPIRATION RATE: 18 BRPM | BODY MASS INDEX: 21.67 KG/M2 | SYSTOLIC BLOOD PRESSURE: 128 MMHG | OXYGEN SATURATION: 98 % | TEMPERATURE: 98 F | DIASTOLIC BLOOD PRESSURE: 82 MMHG | HEART RATE: 94 BPM | WEIGHT: 134.25 LBS

## 2023-08-29 DIAGNOSIS — G40.909 NONINTRACTABLE EPILEPSY WITHOUT STATUS EPILEPTICUS, UNSPECIFIED EPILEPSY TYPE: ICD-10-CM

## 2023-08-29 DIAGNOSIS — R91.1 SOLITARY PULMONARY NODULE: ICD-10-CM

## 2023-08-29 DIAGNOSIS — Z87.39 HISTORY OF DISLOCATION OF SHOULDER: ICD-10-CM

## 2023-08-29 DIAGNOSIS — Z79.899 ENCOUNTER FOR LONG-TERM (CURRENT) USE OF MEDICATIONS: ICD-10-CM

## 2023-08-29 DIAGNOSIS — F41.9 ANXIETY: ICD-10-CM

## 2023-08-29 DIAGNOSIS — R91.1 LUNG NODULE: ICD-10-CM

## 2023-08-29 DIAGNOSIS — R53.83 FATIGUE, UNSPECIFIED TYPE: Primary | ICD-10-CM

## 2023-08-29 PROCEDURE — 3074F SYST BP LT 130 MM HG: CPT | Mod: CPTII,S$GLB,, | Performed by: FAMILY MEDICINE

## 2023-08-29 PROCEDURE — 3079F PR MOST RECENT DIASTOLIC BLOOD PRESSURE 80-89 MM HG: ICD-10-PCS | Mod: CPTII,S$GLB,, | Performed by: FAMILY MEDICINE

## 2023-08-29 PROCEDURE — 99999 PR PBB SHADOW E&M-EST. PATIENT-LVL IV: ICD-10-PCS | Mod: PBBFAC,,, | Performed by: FAMILY MEDICINE

## 2023-08-29 PROCEDURE — 90715 TDAP VACCINE 7 YRS/> IM: CPT | Mod: S$GLB,,, | Performed by: FAMILY MEDICINE

## 2023-08-29 PROCEDURE — 3008F BODY MASS INDEX DOCD: CPT | Mod: CPTII,S$GLB,, | Performed by: FAMILY MEDICINE

## 2023-08-29 PROCEDURE — 3008F PR BODY MASS INDEX (BMI) DOCUMENTED: ICD-10-PCS | Mod: CPTII,S$GLB,, | Performed by: FAMILY MEDICINE

## 2023-08-29 PROCEDURE — 71046 X-RAY EXAM CHEST 2 VIEWS: CPT | Mod: 26,,, | Performed by: RADIOLOGY

## 2023-08-29 PROCEDURE — 90471 TDAP VACCINE GREATER THAN OR EQUAL TO 7YO IM: ICD-10-PCS | Mod: S$GLB,,, | Performed by: FAMILY MEDICINE

## 2023-08-29 PROCEDURE — 90715 TDAP VACCINE GREATER THAN OR EQUAL TO 7YO IM: ICD-10-PCS | Mod: S$GLB,,, | Performed by: FAMILY MEDICINE

## 2023-08-29 PROCEDURE — 71046 XR CHEST PA AND LATERAL: ICD-10-PCS | Mod: 26,,, | Performed by: RADIOLOGY

## 2023-08-29 PROCEDURE — 99999 PR PBB SHADOW E&M-EST. PATIENT-LVL IV: CPT | Mod: PBBFAC,,, | Performed by: FAMILY MEDICINE

## 2023-08-29 PROCEDURE — 71046 X-RAY EXAM CHEST 2 VIEWS: CPT | Mod: TC

## 2023-08-29 PROCEDURE — 3079F DIAST BP 80-89 MM HG: CPT | Mod: CPTII,S$GLB,, | Performed by: FAMILY MEDICINE

## 2023-08-29 PROCEDURE — 99214 OFFICE O/P EST MOD 30 MIN: CPT | Mod: 25,S$GLB,, | Performed by: FAMILY MEDICINE

## 2023-08-29 PROCEDURE — 90471 IMMUNIZATION ADMIN: CPT | Mod: S$GLB,,, | Performed by: FAMILY MEDICINE

## 2023-08-29 PROCEDURE — 1159F MED LIST DOCD IN RCRD: CPT | Mod: CPTII,S$GLB,, | Performed by: FAMILY MEDICINE

## 2023-08-29 PROCEDURE — 3074F PR MOST RECENT SYSTOLIC BLOOD PRESSURE < 130 MM HG: ICD-10-PCS | Mod: CPTII,S$GLB,, | Performed by: FAMILY MEDICINE

## 2023-08-29 PROCEDURE — 99214 PR OFFICE/OUTPT VISIT, EST, LEVL IV, 30-39 MIN: ICD-10-PCS | Mod: 25,S$GLB,, | Performed by: FAMILY MEDICINE

## 2023-08-29 PROCEDURE — 1159F PR MEDICATION LIST DOCUMENTED IN MEDICAL RECORD: ICD-10-PCS | Mod: CPTII,S$GLB,, | Performed by: FAMILY MEDICINE

## 2023-08-29 NOTE — PROGRESS NOTES
Subjective:       Patient ID: Tammie Siddiqui is a 28 y.o. female.    Chief Complaint: Follow-up (Patient has labs that she brought with her. //She reports a fall on 6/24/23 that she dislocated her shoulder in. She was brought to the xray and they found a nodule on her lung during the x-ray. )    Follow-up  Pertinent negatives include no chest pain, chills or fever.       Patient Active Problem List   Diagnosis    History of dislocation of shoulder       Past Medical History:   Diagnosis Date    Anxiety     Epilepsy     History of abnormal cervical Pap smear     Migraine        Past Surgical History:   Procedure Laterality Date    TONSILLECTOMY      WISDOM TOOTH EXTRACTION         Family History   Problem Relation Age of Onset    Dementia Maternal Grandmother         alzheimer's    Heart disease Paternal Grandfather     Pancreatic cancer Paternal Uncle     Seizures Cousin     Alcohol abuse Father     Cancer Father         Prostate    Depression Father     Mental illness Paternal Grandmother         Bipolar    Cancer Paternal Uncle         Pancreatic       Social History     Tobacco Use   Smoking Status Never    Passive exposure: Never   Smokeless Tobacco Never       Wt Readings from Last 5 Encounters:   08/29/23 60.9 kg (134 lb 4.2 oz)   08/31/22 59 kg (130 lb)   12/08/21 58.6 kg (129 lb 3 oz)   04/05/21 58.1 kg (128 lb 1.4 oz)       For further HPI details, see assessment and plan.    Review of Systems   Constitutional:  Negative for chills and fever.   HENT:  Negative for trouble swallowing.    Respiratory:  Negative for shortness of breath.    Cardiovascular:  Negative for chest pain.   Genitourinary:  Negative for difficulty urinating.   Neurological:  Negative for dizziness and light-headedness.   Psychiatric/Behavioral:  The patient is not nervous/anxious.        Objective:      Vitals:    08/29/23 0906   BP: 128/82   Pulse: 94   Resp: 18   Temp: 97.7 °F (36.5 °C)       Physical Exam  Constitutional:        "General: She is not in acute distress.     Appearance: She is not ill-appearing.   Pulmonary:      Effort: Pulmonary effort is normal. No respiratory distress.   Neurological:      General: No focal deficit present.      Mental Status: She is alert.   Psychiatric:         Mood and Affect: Mood normal.         Behavior: Behavior normal.         Assessment:       1. Fatigue, unspecified type    2. Encounter for long-term (current) use of medications    3. Lung nodule    4. Anxiety    5. History of dislocation of shoulder    6. Nonintractable epilepsy without status epilepticus, unspecified epilepsy type        Plan:     Patient presents for follow-up     Anxiety   She is on Lexapro 10 mg now.  She is doing well with that dose.  Continue medication   She is exercising - continue such    She has been feeling particularly fatigued as of late.  Plan to evaluate labs.  Including a thyroid stimulating hormone, comprehensive metabolic panel, B12 level, CBC.      I would also like to check in on her lipids as it has been over a year    Patient has a history of shoulder dislocations.  She recently had to go to the ER.  Imaging indicated a lung nodule.  We will repeat an x-ray to re-evaluate.  If need be we will order a CT scan.  Xray report in June read:  " incidental note is made of a small nodule density within the right mid lung laterally.  Consider follow-up routine nonemergent radiographs of the chest "    History of epilepsy  Currently under the care of the [facility].  She is doing well with her current medication.  She is interested in following up with [facility].  We will try to obtain medical records and arrange for consultation.      Tdap to be updated.      This note was verbally dictated, please excuse any type errors.    "

## 2023-08-31 ENCOUNTER — PATIENT MESSAGE (OUTPATIENT)
Dept: INTERNAL MEDICINE | Facility: CLINIC | Age: 28
End: 2023-08-31
Payer: COMMERCIAL

## 2023-08-31 ENCOUNTER — TELEPHONE (OUTPATIENT)
Dept: INTERNAL MEDICINE | Facility: CLINIC | Age: 28
End: 2023-08-31
Payer: COMMERCIAL

## 2023-08-31 NOTE — TELEPHONE ENCOUNTER
----- Message from Yesica Montero sent at 8/31/2023  4:20 PM CDT -----  Contact: pt  Type:  Patient Returning Call    Who Called: pt  Who Left Message for Patient: vicky  Does the patient know what this is regarding?:   Would the patient rather a call back or a response via [facility]? chart  Best Call Back Number:  Additional Information:  pt is requesting to send message to [facility].       
Message sent    
none

## 2023-08-31 NOTE — TELEPHONE ENCOUNTER
----- Message from Chucho Deshpande MA sent at 8/30/2023  5:07 PM CDT -----  Contact: Tammie    ----- Message -----  From: Melani Cantu  Sent: 8/30/2023   3:52 PM CDT  To: Zora Lam    .Type:  Patient Returning Call    Who Called:Tammie   Who Left Message for Patient:nurse   Does the patient know what this is regarding?:unknown  Would the patient rather a call back or a response via [facility]? call  Best Call Back Number:[phone]  Additional Information: patient returning call

## 2023-09-01 ENCOUNTER — TELEPHONE (OUTPATIENT)
Dept: INTERNAL MEDICINE | Facility: CLINIC | Age: 28
End: 2023-09-01
Payer: COMMERCIAL

## 2023-09-01 NOTE — TELEPHONE ENCOUNTER
I need the UPT order to attach to the lab visit so that she can get the CT done. I have her scheduled for the CT on 9/11 at the [facility]. She is going to have the UPT done same day before the CT.

## 2023-09-06 ENCOUNTER — TELEPHONE (OUTPATIENT)
Dept: INTERNAL MEDICINE | Facility: CLINIC | Age: 28
End: 2023-09-06
Payer: COMMERCIAL

## 2023-09-06 NOTE — TELEPHONE ENCOUNTER
----- Message from Cindi Mujica sent at 9/6/2023 11:37 AM CDT -----  Yuly with [facility] called regarding CT of the chest without contrast and code CPT 62325. She need signed doctor's orders faxed to [phone] and attention Kierra. Need to be faxed within 2 hours. Thx. EL

## 2023-09-12 DIAGNOSIS — R93.89 OPACITY NOTED ON IMAGING STUDY: Primary | ICD-10-CM

## 2023-09-13 ENCOUNTER — TELEPHONE (OUTPATIENT)
Dept: INTERNAL MEDICINE | Facility: CLINIC | Age: 28
End: 2023-09-13
Payer: COMMERCIAL

## 2023-09-13 DIAGNOSIS — R91.8 ABNORMAL CT SCAN, LUNG: Primary | ICD-10-CM

## 2023-09-13 NOTE — TELEPHONE ENCOUNTER
----- Message from Mark Blakely MD sent at 9/13/2023  2:40 PM CDT -----  Please call and set up for repeat CT scan to be done at [facility].  I have also ordered a urine pregnancy test. this will need to be done at [facility] a day before her CT scan

## 2023-09-13 NOTE — TELEPHONE ENCOUNTER
Called patient, informed will fax over the CT scan order to [facility] today. She needs to come to the clinic to get her urine test 1-2 days before the imaging. Patient verbally understood.

## 2023-09-13 NOTE — PROGRESS NOTES
Subjective:       Patient ID: Tammie Siddiqui is a 28 y.o. female.      Called and discussed with the patient.  Plan to repeat in 3 months.  Order placed for external as she found it was cheaper to go to [facility] imaging studies.  Urine pregnancy test to be run 1 day prior to CT.  Message staff to organize with patient

## 2023-09-15 ENCOUNTER — PATIENT MESSAGE (OUTPATIENT)
Dept: INTERNAL MEDICINE | Facility: CLINIC | Age: 28
End: 2023-09-15
Payer: COMMERCIAL

## 2023-09-16 DIAGNOSIS — L70.0 ACNE VULGARIS: ICD-10-CM

## 2023-09-16 NOTE — TELEPHONE ENCOUNTER
Patient is moving pharmacy. Reji needs order from  In order to move it, will not transfer from [facility]. Pend and need refiled.

## 2023-09-16 NOTE — TELEPHONE ENCOUNTER
No care due was identified.  [facility] Embedded Care Due Messages. Reference number: [id number].   9/16/2023 12:47:52 PM CDT

## 2023-09-19 RX ORDER — ESCITALOPRAM OXALATE 10 MG/1
10 TABLET ORAL DAILY
Qty: 90 TABLET | Refills: 2 | Status: SHIPPED | OUTPATIENT
Start: 2023-09-19 | End: 2024-06-15

## 2023-09-19 RX ORDER — OXCARBAZEPINE 600 MG/1
1 TABLET ORAL
OUTPATIENT
Start: 2023-09-19

## 2023-09-19 RX ORDER — TRETINOIN 0.25 MG/G
CREAM TOPICAL
Qty: 20 G | Refills: 5 | Status: SHIPPED | OUTPATIENT
Start: 2023-09-19

## 2023-10-01 NOTE — TELEPHONE ENCOUNTER
No care due was identified.  [facility] Embedded Care Due Messages. Reference number: [id number].   10/01/2023 11:26:04 AM CDT

## 2023-10-02 RX ORDER — ESCITALOPRAM OXALATE 10 MG/1
10 TABLET ORAL DAILY
Qty: 90 TABLET | Refills: 2 | OUTPATIENT
Start: 2023-10-02 | End: 2024-06-28

## 2023-10-03 ENCOUNTER — PATIENT MESSAGE (OUTPATIENT)
Dept: INTERNAL MEDICINE | Facility: CLINIC | Age: 28
End: 2023-10-03
Payer: COMMERCIAL

## 2023-11-14 ENCOUNTER — PATIENT MESSAGE (OUTPATIENT)
Dept: INTERNAL MEDICINE | Facility: CLINIC | Age: 28
End: 2023-11-14
Payer: COMMERCIAL

## 2023-11-15 ENCOUNTER — PATIENT MESSAGE (OUTPATIENT)
Dept: INTERNAL MEDICINE | Facility: CLINIC | Age: 28
End: 2023-11-15
Payer: COMMERCIAL

## 2023-11-15 ENCOUNTER — TELEPHONE (OUTPATIENT)
Dept: INTERNAL MEDICINE | Facility: CLINIC | Age: 28
End: 2023-11-15
Payer: COMMERCIAL

## 2023-11-15 NOTE — TELEPHONE ENCOUNTER
"Your fax has been successfully sent to [phone] at [phone].  ------------------------------------------------------------  From: [id number]  ------------------------------------------------------------  11/15/2023 11:11:14 AM Transmission Record   Sent to [phone] with remote ID "[id number]      "   Result: (0/339;0/0) Success   Page record: 1 - 4   Elapsed time: 01:15 on channel 58      Order sent to [facility] [employer].     Auth dept notified and the auth is pending.     "

## 2023-12-13 ENCOUNTER — PATIENT MESSAGE (OUTPATIENT)
Dept: INTERNAL MEDICINE | Facility: CLINIC | Age: 28
End: 2023-12-13
Payer: COMMERCIAL

## 2023-12-14 ENCOUNTER — PATIENT MESSAGE (OUTPATIENT)
Dept: INTERNAL MEDICINE | Facility: CLINIC | Age: 28
End: 2023-12-14
Payer: COMMERCIAL

## 2023-12-15 DIAGNOSIS — R91.8 ABNORMAL CT SCAN, LUNG: Primary | ICD-10-CM

## 2023-12-22 ENCOUNTER — PATIENT MESSAGE (OUTPATIENT)
Dept: INTERNAL MEDICINE | Facility: CLINIC | Age: 28
End: 2023-12-22
Payer: COMMERCIAL

## 2024-01-16 ENCOUNTER — TELEPHONE (OUTPATIENT)
Dept: INTERNAL MEDICINE | Facility: CLINIC | Age: 29
End: 2024-01-16
Payer: COMMERCIAL

## 2024-01-16 DIAGNOSIS — R91.1 LUNG NODULE: Primary | ICD-10-CM

## 2024-01-16 NOTE — PROGRESS NOTES
"Subjective:       Patient ID: Tammie Siddiqui is a 28 y.o. female.    Patient sent me a message enquiring about her external CT scan.  I had not received any records of results.  I ask staff to reach out to [facility] and the results were faxed to us.  "  There is a spiculated partially calcified nodule the right upper lobe is increased in size from the prior CT and now measures 13 x 9 mm as compared to 12 x 6 mm on the CT of September 11, 2023.  Findings of chronic granulomatous disease are present this patient.  However, given this interval enlargement, this raises concern for malignancy.  PET scan is recommended for further evaluation."    Called to discuss this imaging finding with the patient.  Plan was to consult pulmonology and defer PET scan to specialist.  Unfortunately patient is out of insurance due to job transition.  She is out of insurance until sometime in March wishes several weeks away.    Discussed the uncertainty but my discomfort with waiting.  Discussed I do not typically order PET scans but I do not want any further delay.  I will place the order for a PET scan and we will inquire about private pay cost.    Placing external consult for pulmonology at [facility] given lack of insurance and a case management consultation for guidance on any benefits for uninsured patients.  I am not sure if either will be overly helpful but it is worth a try.  Pulmonology consult we will be placed at [facility] upon patient receiving insurance.      "

## 2024-01-16 NOTE — TELEPHONE ENCOUNTER
----- Message from Mark Blakely MD sent at 1/16/2024  9:21 AM CST -----  Please find out where her CT results are.  Please get me the results once we have them obtained

## 2024-01-17 ENCOUNTER — TELEPHONE (OUTPATIENT)
Dept: INTERNAL MEDICINE | Facility: CLINIC | Age: 29
End: 2024-01-17

## 2024-01-17 ENCOUNTER — PATIENT MESSAGE (OUTPATIENT)
Dept: INTERNAL MEDICINE | Facility: CLINIC | Age: 29
End: 2024-01-17

## 2024-01-17 NOTE — TELEPHONE ENCOUNTER
PET scheduled for 23rd at 1030. Attempted to call patient and let her know that the testing was scheduled. Asked for call back.

## 2024-01-17 NOTE — TELEPHONE ENCOUNTER
----- Message from Fernanda Diop sent at 1/17/2024  9:36 AM CST -----  Regarding: Patient Returning Missed cAll  Contact: Tammie  .Type:  Patient Returning Call    Who Called: Tammie  Who Left Message for Patient: Sheila Whitley LPN  Does the patient know what this is regarding?:  Would the patient rather a call back or a response via [facility]? call  Best Call Back Number: [phone]  Additional Information:  Tammie is returning the missed call today.

## 2024-01-24 ENCOUNTER — HOSPITAL ENCOUNTER (OUTPATIENT)
Dept: RADIOLOGY | Facility: HOSPITAL | Age: 29
Discharge: HOME | End: 2024-01-24
Attending: FAMILY MEDICINE

## 2024-01-24 ENCOUNTER — PATIENT MESSAGE (OUTPATIENT)
Dept: INTERNAL MEDICINE | Facility: CLINIC | Age: 29
End: 2024-01-24

## 2024-01-24 DIAGNOSIS — R91.1 LUNG NODULE: ICD-10-CM

## 2024-01-24 PROCEDURE — 78815 PET IMAGE W/CT SKULL-THIGH: CPT | Mod: 26,PI,, | Performed by: RADIOLOGY

## 2024-01-24 PROCEDURE — 78815 PET IMAGE W/CT SKULL-THIGH: CPT | Mod: TC

## 2024-01-24 PROCEDURE — A9552 F18 FDG: HCPCS

## 2024-02-04 ENCOUNTER — PATIENT MESSAGE (OUTPATIENT)
Dept: INTERNAL MEDICINE | Facility: CLINIC | Age: 29
End: 2024-02-04

## 2024-03-11 ENCOUNTER — PATIENT OUTREACH (OUTPATIENT)
Dept: ADMINISTRATIVE | Facility: OTHER | Age: 29
End: 2024-03-11

## 2024-03-15 NOTE — PROGRESS NOTES
CHW - Initial Contact and Case Closure    This Community Health Worker completed the Social Determinant of Health questionnaire with patient via telephone today.    The pt was referred for insurance issues but stated she just paid out of pocket. Pt declined answering the SDOH and has no needs.   Pt denied any additional needs at this time and agrees with episode closure at this time.  Provided patient with Community Health Worker's contact information and encouraged her to contact this Community Health Worker if additional needs arise.

## 2024-05-31 RX ORDER — ESCITALOPRAM OXALATE 10 MG/1
10 TABLET ORAL
Qty: 90 TABLET | Refills: 0 | Status: SHIPPED | OUTPATIENT
Start: 2024-05-31

## 2024-05-31 NOTE — TELEPHONE ENCOUNTER
No care due was identified.  [facility] Embedded Care Due Messages. Reference number: [id number].   5/31/2024 8:10:13 AM CDT

## 2024-06-10 DIAGNOSIS — L70.0 ACNE VULGARIS: ICD-10-CM

## 2024-06-10 RX ORDER — TRETINOIN 0.25 MG/G
CREAM TOPICAL
Qty: 20 G | Refills: 5 | Status: SHIPPED | OUTPATIENT
Start: 2024-06-10

## 2024-06-11 DIAGNOSIS — L70.0 ACNE VULGARIS: ICD-10-CM

## 2024-06-11 RX ORDER — TRETINOIN 0.25 MG/G
CREAM TOPICAL
Qty: 20 G | Refills: 5 | OUTPATIENT
Start: 2024-06-11

## 2024-07-08 DIAGNOSIS — L70.0 ACNE VULGARIS: ICD-10-CM

## 2024-07-09 ENCOUNTER — PATIENT MESSAGE (OUTPATIENT)
Dept: RESEARCH | Facility: HOSPITAL | Age: 29
End: 2024-07-09
Payer: COMMERCIAL

## 2024-07-09 RX ORDER — TRETINOIN 0.25 MG/G
CREAM TOPICAL
Qty: 20 G | Refills: 5 | Status: SHIPPED | OUTPATIENT
Start: 2024-07-09

## 2024-07-09 RX ORDER — ESCITALOPRAM OXALATE 10 MG/1
10 TABLET ORAL DAILY
Qty: 90 TABLET | Refills: 0 | Status: SHIPPED | OUTPATIENT
Start: 2024-07-09

## 2024-07-09 NOTE — TELEPHONE ENCOUNTER
No care due was identified.  [facility] Embedded Care Due Messages. Reference number: [id number].   7/08/2024 7:52:17 PM CDT

## 2024-07-09 NOTE — TELEPHONE ENCOUNTER
Refill Decision Note   Tammie Chen  is requesting a refill authorization.  Brief Assessment and Rationale for Refill:  Approve     Medication Therapy Plan:         Comments:     Note composed:11:18 AM 07/09/2024

## 2024-09-03 ENCOUNTER — OFFICE VISIT (OUTPATIENT)
Dept: NEUROLOGY | Facility: CLINIC | Age: 29
End: 2024-09-03
Payer: COMMERCIAL

## 2024-09-03 VITALS
SYSTOLIC BLOOD PRESSURE: 121 MMHG | WEIGHT: 134.5 LBS | BODY MASS INDEX: 21.71 KG/M2 | RESPIRATION RATE: 16 BRPM | HEART RATE: 67 BPM | DIASTOLIC BLOOD PRESSURE: 84 MMHG

## 2024-09-03 DIAGNOSIS — G40.919 BREAKTHROUGH SEIZURE: ICD-10-CM

## 2024-09-03 DIAGNOSIS — G40.909 NOCTURNAL EPILEPSY: ICD-10-CM

## 2024-09-03 DIAGNOSIS — Z87.39 HISTORY OF DISLOCATION OF SHOULDER: ICD-10-CM

## 2024-09-03 DIAGNOSIS — G47.30 SLEEP APNEA, UNSPECIFIED TYPE: ICD-10-CM

## 2024-09-03 DIAGNOSIS — G40.109 TEMPORAL LOBE EPILEPSY: Primary | ICD-10-CM

## 2024-09-03 PROCEDURE — 3008F BODY MASS INDEX DOCD: CPT | Mod: CPTII,S$GLB,, | Performed by: PSYCHIATRY & NEUROLOGY

## 2024-09-03 PROCEDURE — 3079F DIAST BP 80-89 MM HG: CPT | Mod: CPTII,S$GLB,, | Performed by: PSYCHIATRY & NEUROLOGY

## 2024-09-03 PROCEDURE — 99205 OFFICE O/P NEW HI 60 MIN: CPT | Mod: S$GLB,,, | Performed by: PSYCHIATRY & NEUROLOGY

## 2024-09-03 PROCEDURE — 1159F MED LIST DOCD IN RCRD: CPT | Mod: CPTII,S$GLB,, | Performed by: PSYCHIATRY & NEUROLOGY

## 2024-09-03 PROCEDURE — 3074F SYST BP LT 130 MM HG: CPT | Mod: CPTII,S$GLB,, | Performed by: PSYCHIATRY & NEUROLOGY

## 2024-09-03 PROCEDURE — 99999 PR PBB SHADOW E&M-EST. PATIENT-LVL V: CPT | Mod: PBBFAC,,, | Performed by: PSYCHIATRY & NEUROLOGY

## 2024-09-03 RX ORDER — OXCARBAZEPINE 300 MG/1
300 TABLET ORAL NIGHTLY
Qty: 90 TABLET | Refills: 3 | Status: SHIPPED | OUTPATIENT
Start: 2024-09-03

## 2024-09-03 RX ORDER — OXCARBAZEPINE 600 MG/1
1 TABLET ORAL NIGHTLY
Qty: 90 TABLET | Refills: 3 | Status: SHIPPED | OUTPATIENT
Start: 2024-09-03

## 2024-09-03 NOTE — PROGRESS NOTES
Subjective:       Patient ID: Tammie Siddiqui is a 29 y.o. female.    Chief Complaint: Seizures (Concerns of sleep apnea )          HPI    The patient presented on 09- for evaluation of epilepsy.      The patient started having seizures on 05-. The first seizure occurred during sleep on the sofa after few drinks. The seizure, like all her seizures, was described as grand mal seizure/GTC consisting of generalized tensing and muscle jerking with eyes deviated upwards with foamy secretions from the mouth, tongue biting and urine incontinence followed by post-ictal confusion and RT shoulder dislocation.  The patient  denied history of Stroke, TBI, Meningitis or Febrile Seizures. Her maternal first cousin had childhood epilepsy.  Previous EEGs showed LT TLE. The patient tends to have annual seizures  (last seizure was on 09-) and they always happen after sleep deprivation, excessive alcohol drinking, dehydration and THC smoking and all the seizures are GTCs and nocturnal. Tried LTG in the past before switching to OXC  mg QHS          Review of Systems   Constitutional:  Negative for appetite change and fatigue.   HENT:  Negative for hearing loss and tinnitus.    Eyes:  Negative for photophobia and visual disturbance.   Respiratory:  Negative for apnea and shortness of breath.    Cardiovascular:  Negative for chest pain and palpitations.   Gastrointestinal:  Negative for nausea and vomiting.   Endocrine: Negative for cold intolerance and heat intolerance.   Genitourinary:  Negative for difficulty urinating and urgency.   Musculoskeletal:  Negative for arthralgias, back pain, gait problem, joint swelling, myalgias, neck pain and neck stiffness.   Skin:  Negative for color change and rash.   Allergic/Immunologic: Negative for environmental allergies and immunocompromised state.   Neurological:  Positive for seizures. Negative for dizziness, tremors, syncope, facial asymmetry, speech difficulty,  weakness, light-headedness, numbness and headaches.   Hematological:  Negative for adenopathy. Does not bruise/bleed easily.   Psychiatric/Behavioral:  Negative for agitation, behavioral problems, confusion, decreased concentration, dysphoric mood, hallucinations, self-injury, sleep disturbance and suicidal ideas. The patient is nervous/anxious. The patient is not hyperactive.                Current Outpatient Medications:     EScitalopram oxalate (LEXAPRO) 10 MG tablet, Take 1 tablet (10 mg total) by mouth once daily., Disp: 90 tablet, Rfl: 0    OXcarbazepine (OXTELLAR XR) 600 mg Tb24, Take 1 tablet by mouth., Disp: , Rfl:     tretinoin (RETIN-A) 0.025 % cream, Apply topically every evening. Pea sized amount to entire face at night. If irritation occurs, decrease use to every other day., Disp: 20 g, Rfl: 5    Past Medical History:   Diagnosis Date    Anxiety     Epilepsy     History of abnormal cervical Pap smear     Migraine        Past Surgical History:   Procedure Laterality Date    TONSILLECTOMY      WISDOM TOOTH EXTRACTION         Social History     Socioeconomic History    Marital status: Unknown   Tobacco Use    Smoking status: Never     Passive exposure: Never    Smokeless tobacco: Never   Substance and Sexual Activity    Alcohol use: Yes     Alcohol/week: 9.0 standard drinks of alcohol     Types: 2 Glasses of wine, 5 Cans of beer, 2 Shots of liquor per week    Drug use: Never    Sexual activity: Yes     Partners: Male     Birth control/protection: Condom, I.U.D.     Social Determinants of Health     Financial Resource Strain: Patient Declined (3/15/2024)    Overall Financial Resource Strain (CARDIA)     Difficulty of Paying Living Expenses: Patient declined   Food Insecurity: Patient Declined (3/15/2024)    Hunger Vital Sign     Worried About Running Out of Food in the Last Year: Patient declined     Ran Out of Food in the Last Year: Patient declined   Transportation Needs: Patient Declined (3/15/2024)     PRAPARE - Transportation     Lack of Transportation (Medical): Patient declined     Lack of Transportation (Non-Medical): Patient declined   Physical Activity: Patient Declined (3/15/2024)    Exercise Vital Sign     Days of Exercise per Week: Patient declined     Minutes of Exercise per Session: Patient declined   Stress: Patient Declined (3/15/2024)    [place] [employer] - Occupational Stress Questionnaire     Feeling of Stress : Patient declined   Housing Stability: Patient Declined (3/15/2024)    Housing Stability Vital Sign     Unable to Pay for Housing in the Last Year: Patient declined     Unstable Housing in the Last Year: Patient declined             Past/Current Medical/Surgical History, Past/Current Social History, Past/Current Family History and Past/Current Medications were reviewed in detail.        Objective:           VITAL SIGNS WERE REVIEWED      GENERAL APPEARANCE:     The patient looks comfortable.    BMI 21.71    No signs of respiratory distress.    Normal breathing pattern.    No dysmorphic features    Normal eye contact.       GENERAL MEDICAL EXAM:    HEENT:  Head is atraumatic normocephalic. Evidence of tongue biting.    FUNDUSCOPIC (OPHTHALMOSCOPIC) EXAMINATION showed no disc edema (papilledema).      NECK: No JVD. No visible lesions or goiters.     CHEST-CARDIOPULMONARY: No cyanosis. No tachypnea. Normal respiratory effort.    ZQSNAJC-[id number]-JSDYFKMORO: No jaundice. No stomas or lesions. No visible hernias. No catheters.     SKIN, HAIR, NAILS: No pathognomonic skin rash.No neurofibromatosis. No visible lesions.No stigmata of autoimmune disease. No clubbing.    LIMBS: No varicose veins. No visible swelling.    MUSCULOSKELETAL: No visible deformities.No visible lesions. Decreased ROM RT shoulder.              Neurologic Exam     Mental Status   Oriented to person, place, and time.   Follows 3 step commands.   Attention: normal. Concentration: normal.   Speech:  speech is normal   Level of consciousness: alert  Able to name object. Able to repeat. Normal comprehension.     Cranial Nerves   Cranial nerves II through XII intact.     CN II   Visual fields full to confrontation.   Visual acuity: normal  Right visual field deficit: none  Left visual field deficit: none     CN III, IV, VI   Pupils are equal, round, and reactive to light.  Extraocular motions are normal.   Right pupil: Size: 2 mm. Shape: regular. Reactivity: brisk. Consensual response: intact. Accommodation: intact.   Left pupil: Size: 2 mm. Shape: regular. Reactivity: brisk. Consensual response: intact. Accommodation: intact.   CN III: no CN III palsy  CN VI: no CN VI palsy  Nystagmus: none   Diplopia: none  Ophthalmoparesis: none  Upgaze: normal  Downgaze: normal  Conjugate gaze: present  Vestibulo-ocular reflex: present    CN V   Facial sensation intact.   Right facial sensation deficit: none  Left facial sensation deficit: none  Jaw jerk: normal    CN VII   Facial expression full, symmetric.   Right facial weakness: none  Left facial weakness: none    CN VIII   CN VIII normal.   Hearing: intact    CN IX, X   CN IX normal.   CN X normal.   Palate: symmetric    CN XI   CN XI normal.   Right sternocleidomastoid strength: normal  Left sternocleidomastoid strength: normal  Right trapezius strength: normal  Left trapezius strength: normal    CN XII   CN XII normal.   Tongue: not atrophic  Fasciculations: absent  Tongue deviation: none    Motor Exam   Muscle bulk: normal  Overall muscle tone: normal  Right arm tone: normal  Left arm tone: normal  Right arm pronator drift: absent  Left arm pronator drift: absent  Right leg tone: normal  Left leg tone: normal    Strength   Strength 5/5 throughout.   Right neck flexion: 5/5  Left neck flexion: 5/5  Right neck extension: 5/5  Left neck extension: 5/5  Right deltoid: 5/5  Left deltoid: 5/5  Right biceps: 5/5  Left biceps: 5/5  Right triceps: 5/5  Left triceps: 5/5  Right  wrist flexion: 5/5  Left wrist flexion: 5/5  Right wrist extension: 5/5  Left wrist extension: 5/5  Right interossei: 5/5  Left interossei: 5/5  Right iliopsoas: 5/5  Left iliopsoas: 5/5  Right quadriceps: 5/5  Left quadriceps: 5/5  Right hamstrin/5  Left hamstrin/5  Right glutei: 5/5  Left glutei: 5/5  Right anterior tibial: 5/5  Left anterior tibial: 5/5  Right posterior tibial: 5/5  Left posterior tibial: 5/5  Right peroneal: 5/5  Left peroneal: 5/5  Right gastroc: 5/5  Left gastroc: 5/5    Sensory Exam   Light touch normal.   Right arm light touch: normal  Left arm light touch: normal  Right leg light touch: normal  Left leg light touch: normal  Vibration normal.   Right arm vibration: normal  Left arm vibration: normal  Right leg vibration: normal  Left leg vibration: normal  Proprioception normal.   Right arm proprioception: normal  Left arm proprioception: normal  Right leg proprioception: normal  Left leg proprioception: normal  Pinprick normal.   Right arm pinprick: normal  Left arm pinprick: normal  Right leg pinprick: normal  Left leg pinprick: normal  Graphesthesia: normal  Stereognosis: normal    Gait, Coordination, and Reflexes     Gait  Gait: normal    Coordination   Romberg: negative  Finger to nose coordination: normal  Heel to shin coordination: normal  Tandem walking coordination: normal    Tremor   Resting tremor: absent  Intention tremor: absent  Action tremor: absent    Reflexes   Right brachioradialis: 2+  Left brachioradialis: 2+  Right biceps: 2+  Left biceps: 2+  Right triceps: 2+  Left triceps: 2+  Right patellar: 2+  Left patellar: 2+  Right achilles: 2+  Left achilles: 2+  Right plantar: normal  Left plantar: normal  Right Pa: absent  Left Pa: absent  Right ankle clonus: absent  Left ankle clonus: absent  Right pendular knee jerk: absent  Left pendular knee jerk: absent      Lab Results   Component Value Date    WBC 7.84 2023    HGB 14.8 2023    HCT 43.9  08/29/2023    MCV 89 08/29/2023     08/29/2023       Sodium   Date Value Ref Range Status   08/29/2023 139 136 - 145 mmol/L Final     Potassium   Date Value Ref Range Status   08/29/2023 4.1 3.5 - 5.1 mmol/L Final     Chloride   Date Value Ref Range Status   08/29/2023 104 95 - 110 mmol/L Final     CO2   Date Value Ref Range Status   08/29/2023 23 23 - 29 mmol/L Final     Glucose   Date Value Ref Range Status   08/29/2023 79 70 - 110 mg/dL Final     BUN   Date Value Ref Range Status   08/29/2023 10 6 - 20 mg/dL Final     Creatinine   Date Value Ref Range Status   08/29/2023 0.7 0.5 - 1.4 mg/dL Final     Calcium   Date Value Ref Range Status   08/29/2023 9.7 8.7 - 10.5 mg/dL Final     Total Protein   Date Value Ref Range Status   08/29/2023 7.0 6.0 - 8.4 g/dL Final     Albumin   Date Value Ref Range Status   08/29/2023 4.4 3.5 - 5.2 g/dL Final     Total Bilirubin   Date Value Ref Range Status   08/29/2023 0.9 0.1 - 1.0 mg/dL Final     Comment:     For infants and newborns, interpretation of results should be based  on gestational age, weight and in agreement with clinical  observations.    Premature Infant recommended reference ranges:  Up to 24 hours.............<8.0 mg/dL  Up to 48 hours............<12.0 mg/dL  3-5 days..................<15.0 mg/dL  6-29 days.................<15.0 mg/dL       Alkaline Phosphatase   Date Value Ref Range Status   08/29/2023 63 55 - 135 U/L Final     AST   Date Value Ref Range Status   08/29/2023 19 10 - 40 U/L Final     ALT   Date Value Ref Range Status   08/29/2023 13 10 - 44 U/L Final     Anion Gap   Date Value Ref Range Status   08/29/2023 12 8 - 16 mmol/L Final     eGFR if    Date Value Ref Range Status   04/05/2021 >60.0 >60 mL/min/1.73 m^2 Final     eGFR if non    Date Value Ref Range Status   04/05/2021 >60.0 >60 mL/min/1.73 m^2 Final     Comment:     Calculation used to obtain the estimated glomerular filtration  rate (eGFR) is the  CKD-EPI equation.          Lab Results   Component Value Date    [id number] 768 08/29/2023       Lab Results   Component Value Date    TSH 1.393 08/29/2023       LABORATORY EVALUATION      [id number]    CBC, CMP, TFT, B12,HA1C, Vitamin D, Zn, HIV, HCV Unremarkable          AED MONITORING      AED: OXC  RANGE: 10-35  DOSE    DATE LEVEL    06- 9 600 mg QD                                           RADIOLOGY EVALUATION       NEUROPHYSIOLOGY EVALUATION       01-    EEG LT TLE      01-    EEG NL    PATHOLOGY EVALUATION        NEUROCOGNITIVE AND NEUROPSYCHOLOGY EVALUATION           Reviewed the neuroimaging independently       Assessment:           1. Temporal lobe epilepsy    2. History of dislocation of shoulder    3. Breakthrough seizure    4. Nocturnal epilepsy    5. Sleep apnea, unspecified type                EPILEPSY CLASSIFICATION    SEMIOLOGY:  NOCTURNAL GTC     EPILEPTOGENIC ZONE (S):  TEMPORAL LOBE (LEFT)    ETIOLOGY: UNKNOWN     PRIOR AEDS: LTG    CURRENT AEDS: OXC    LAST SEIZURE DATE: 09- (ALCOHOL, THC, SLEEP DEPRIVATION, DEHYDRATION)       COMPREHENSIVE LIST OF AEDs:     Acetazolamide (AZM-Diamox)   Benzos: clonazepam (CZP Klonopin), lorazepam (LZP-Ativan), diazepam (DZP-Valium), clorazepate (CLZ- Tranxene)  Brivaracetam (BRV-Briviact)  Cannabidiol (CBD- Epidiolex)  Carbamazepine (CBZ-Tegretol)  Cenobamate (CNB-Xcopri)  Clobazam (CLB-Onfi)  Eslicarbazepine (ESL-Aptiom)  Ethosuximide (ESX-Zarontin)  Felbamate (FBM-Felbatol)  Fenfluramine (FFA-Fintepla)  Gabapentin (GBP-Neurontin)  Lacosamide (LCM-Vimpat)  Lamotrigine (LTG-Lamitcal)  Levetiracetam (LEV- Keppra)  Oxcarbazepine (OXC-Trileptal)  Perampanel (PML-Fycompa)  Phenobarbital (PB)  Phenytoin (PHT-Dilantin)  Pregabalin (PGB-Lyrica)  Primidone (PRM)   Retigabine (RTG- Potiga) Discontinued in 2017  Rufinamide (RFN-Benzil)  Stiripentol (STP-Diacomit)  Tiagabine (TGB-Gabitril)  Topiramate (TPM-Topamax)  Valproate  (VPA-Depakote)  Vigabatrin (VGB-Sabril)  Zonisamide (ZNS-Zonegran)   Plan:             NOCTURNAL EPILEPSY, LEFT TEMPORAL, BREAKTHROUGH SEIZURES (ALCOHOL, THC, SLEEP DEPRIVATION )        EEG and AEEG to evaluate for epileptiform discharges.     Brain MRI WWO to evaluate for epileptogenic lesions.        The patient was encouraged to maintain full traditional seizure precautions which include but not limited to avoidance of driving, biking, high altitudes (ladders, escalators, rock climbing, mountain climbing, skiing, eleuterio diving, moderate to difficult hiking), proximity to fire or fire source, proximity to body of water or swimming alone, operating heavy and potentially risky machinery, using sharp objects, using exercise machines like treadmill and weight lifting. Walking while accompanied on soft surfaces like grass is preferable.The patient was encouraged to shower (without accumulation of water) instead of taking a bath if unsupervised. The patient was made aware that these precautions are especially important during concurrent illnesses, fever, infections, vomiting, changing medications and running out of anti-seizure medications. Instructed the patient to avoid night shifts, sleep deprivation and alcohol or recreational drug use as adequate sleep and avoidance of alcohol/drugs are very important measures to assure good seizure control. The patient was also advised not to care for young children without company. The patient is advised to pad the side rails with pillows and blankets if applicable.I strongly recommended lowering the bed to the floor level to decrease the risk of falls during nocturnal seizures that occur during sleep. I also instructed the patient to avoid safety sensitive duties. In general, any activity that requires full awareness and would result in serious injury to self and others if a seizure takes place should be avoided.        Made the patient aware that the duration of  restrictions/precautions varies and in [place] it is 6 months. The patient verbalized  full understanding.                      Change OXC XR from 600 mg to 900 mg QHS. Check level with CBC, CMP in 2 weeks.    Continue AEDs INDEFINITELY, FOR GOOD AND NEVER SKIP A DOSE. The patient verbalized full understanding. Stressed the extreme medical, personal safety, public safety and legal importance of compliance and seizure control. Will give as many refills as possible with or without face-to-face evaluation to make sure the patient and any patient with epilepsy will never run out of medications. Running out of seizure medications should never happen under our care. I explained to the patient that he should not, under any circumstances, adjust or stop taking his seizure medication without discussing with me. Warned the patient about SUDEP. The patient verbalized full understanding.         AVOID any activity and or substance that could lower seizure threshold including but not limited to:            SUN EXPOSURE     SLEEP DEPRIVATION     DEHYDRATION     ALCOHOL AND WITHDRAWAL      TETRAHYDROCANNABINOL (THC)          TRAMADOL.     MEPERIDINE (DEMEROL)     ALL STIMULANTS-ALL ADHD MEDICATIONS.      CLOZAPINE.      BUPROPION (WELLBUTRIN)     CIPROFLOXACIN.    CYCLOSPORINE.     METOCLOPRAMIDE (REGLAN).     KRATOM     PHOSPHODIESTERASE 5 INHIBITORS (PDE5i) ED MEDICATIONS (SILDENAFIL (VIAGRA), VARDENAFIL (LEVITRA)  , TADALAFIL (CIALIS), AVANAFIL (STENDRA)             FAMILY TEACHING ON HANDLING SEIZURES     Do not try to stop the seizure.    No not put anything is the patient's mouth.    Place on the patient's side in a safe place and keep the patient safe and away from any sharp objects.    Call 911 for seizure that lasts >3 minutes, repetitive seizures or the patient not regaining consciousness after the seizure.    Videotape the seizure if possible.                  MEDICAL/SURGICAL COMORBIDITIES     All relevant medical  comorbidities noted and managed by primary care physician and medical care team.          HEALTHY LIFESTYLE AND PREVENTATIVE CARE    The patient to adhere to the age-appropriate health maintenance guidelines including screening tests and vaccinations. The patient to adhere to  healthy lifestyle, optimal weight, exercise, healthy diet, good sleep hygiene and avoiding drugs including smoking, alcohol and recreational drugs.          RTC 3 MONTHS         Snehal Payne MD, FAAN    Attending Neurologist/Epileptologist         Diplomate, American Board of Psychiatry and Neurology    Diplomate, American Board of Clinical Neurophysiology     Fellow, American Academy of Neurology         I spent a total of 64 minutes on the day of the visit.  This includes face to face time and non-face to face time preparing to see the patient (eg, review of tests), obtaining and/or reviewing separately obtained history, documenting clinical information in the electronic or other health record, independently interpreting results and communicating results to the patient/family/caregiver, or care coordinator.

## 2024-09-03 NOTE — PATIENT INSTRUCTIONS
SUDEP           The patient was encouraged to maintain full traditional seizure precautions which include but not limited to avoidance of driving, biking, high altitudes (ladders, escalators, rock climbing, mountain climbing, skiing, eleuterio diving, moderate to difficult hiking), proximity to fire or fire source, proximity to body of water or swimming alone, operating heavy and potentially risky machinery, using sharp objects, using exercise machines like treadmill and weight lifting. Walking while accompanied on soft surfaces like grass is preferable.The patient was encouraged to shower (without accumulation of water) instead of taking a bath if unsupervised. The patient was made aware that these precautions are especially important during concurrent illnesses, fever, infections, vomiting, changing medications and running out of anti-seizure medications. Instructed the patient to avoid night shifts, sleep deprivation and alcohol or recreational drug use as adequate sleep and avoidance of alcohol/drugs are very important measures to assure good seizure control. The patient was also advised not to care for young children without company. The patient is advised to pad the side rails with pillows and blankets if applicable.I strongly recommended lowering the bed to the floor level to decrease the risk of falls during nocturnal seizures that occur during sleep. I also instructed the patient to avoid safety sensitive duties. In general, any activity that requires full awareness and would result in serious injury to self and others if a seizure takes place should be avoided.            SEIZURES TRIGGERS:           SUN EXPOSURE     SLEEP DEPRIVATION     DEHYDRATION     ALCOHOL AND WITHDRAWAL      TETRAHYDROCANNABINOL (THC)        TRAMADOL.     MEPERIDINE (DEMEROL)     ALL STIMULANTS-ALL ADHD MEDICATIONS.      CLOZAPINE.      BUPROPION (WELLBUTRIN)     CIPROFLOXACIN.    CYCLOSPORINE.     METOCLOPRAMIDE (REGLAN).        ROSEANNA     PHOSPHODIESTERASE 5 INHIBITORS (PDE5i) ED MEDICATIONS (SILDENAFIL (VIAGRA), VARDENAFIL (LEVITRA)  , TADALAFIL (CIALIS), AVANAFIL (STENDRA)                     FAMILY TEACHING ON HANDLING SEIZURES           Do not try to stop the seizure.    No not put anything is the patient's mouth.    Place on the patient's side in a safe place and keep the patient safe and away from any sharp objects.    Call 911 for seizure that lasts >3 minutes, repetitive seizures or the patient not regaining consciousness after the seizure.    Videotape the seizure if possible.

## 2024-09-04 ENCOUNTER — TELEPHONE (OUTPATIENT)
Dept: NEUROLOGY | Facility: CLINIC | Age: 29
End: 2024-09-04
Payer: COMMERCIAL

## 2024-09-04 NOTE — TELEPHONE ENCOUNTER
----- Message from Cecile Bang sent at 9/4/2024  1:59 PM CDT -----  Regarding: Req Orders  Contact: [phone]  MERA GIRALDO calling regarding Orders (message) for Eddie with [employer] is calling to req orders for MRI brain with and w/o contrast to be sent to [email or web address] (FAX: [phone])

## 2024-09-06 ENCOUNTER — TELEPHONE (OUTPATIENT)
Dept: NEUROLOGY | Facility: CLINIC | Age: 29
End: 2024-09-06
Payer: COMMERCIAL

## 2024-09-06 NOTE — TELEPHONE ENCOUNTER
----- Message from Iesha Delcid sent at 9/6/2024 10:21 AM CDT -----  Regarding: location for MRI  Contact: Kait  Consult/Advisory     Name Of Caller:Kait        Contact Preference:    [phone] ( fax)          Nature of call: Kait calling to get  MRI orders switched to  the [facility] facility. Please advise.

## 2024-09-18 ENCOUNTER — PATIENT MESSAGE (OUTPATIENT)
Dept: NEUROLOGY | Facility: CLINIC | Age: 29
End: 2024-09-18
Payer: COMMERCIAL

## 2024-09-24 ENCOUNTER — OFFICE VISIT (OUTPATIENT)
Dept: INTERNAL MEDICINE | Facility: CLINIC | Age: 29
End: 2024-09-24
Payer: COMMERCIAL

## 2024-09-24 VITALS
WEIGHT: 137.38 LBS | TEMPERATURE: 96 F | BODY MASS INDEX: 22.08 KG/M2 | OXYGEN SATURATION: 98 % | HEIGHT: 66 IN | SYSTOLIC BLOOD PRESSURE: 120 MMHG | DIASTOLIC BLOOD PRESSURE: 80 MMHG | HEART RATE: 75 BPM

## 2024-09-24 DIAGNOSIS — S43.004D DISLOCATION OF RIGHT SHOULDER JOINT, SUBSEQUENT ENCOUNTER: ICD-10-CM

## 2024-09-24 DIAGNOSIS — F41.9 ANXIETY: ICD-10-CM

## 2024-09-24 DIAGNOSIS — G40.919 BREAKTHROUGH SEIZURE: ICD-10-CM

## 2024-09-24 DIAGNOSIS — Z87.39 HISTORY OF DISLOCATION OF SHOULDER: ICD-10-CM

## 2024-09-24 DIAGNOSIS — G40.109 TEMPORAL LOBE EPILEPSY: ICD-10-CM

## 2024-09-24 DIAGNOSIS — Z79.899 ENCOUNTER FOR LONG-TERM (CURRENT) USE OF MEDICATIONS: Primary | ICD-10-CM

## 2024-09-24 PROCEDURE — 99999 PR PBB SHADOW E&M-EST. PATIENT-LVL III: CPT | Mod: PBBFAC,,, | Performed by: FAMILY MEDICINE

## 2024-09-24 RX ORDER — ESCITALOPRAM OXALATE 10 MG/1
10 TABLET ORAL DAILY
Qty: 90 TABLET | Refills: 3 | Status: SHIPPED | OUTPATIENT
Start: 2024-09-24

## 2024-09-24 NOTE — PROGRESS NOTES
"Subjective:       Patient ID: Tammie Siddiqui is a 29 y.o. female.    Chief Complaint: f/u    HPI    Patient Active Problem List   Diagnosis    History of dislocation of shoulder    Temporal lobe epilepsy    Breakthrough seizure    Nocturnal epilepsy       Past Medical History:   Diagnosis Date    Anxiety     Epilepsy     History of abnormal cervical Pap smear     Migraine        Past Surgical History:   Procedure Laterality Date    TONSILLECTOMY      WISDOM TOOTH EXTRACTION         Family History   Problem Relation Name Age of Onset    Dementia Maternal Grandmother          alzheimer's    Heart disease Paternal Grandfather Tyler Siddiqui     Pancreatic cancer Paternal Uncle      Seizures Cousin      Alcohol abuse Father Percy Siddiqui     Cancer Father Percy Siddiqui         Prostate    Depression Father Percy Siddiqui     Mental illness Paternal Grandmother Maddison Siddiqui         Bipolar    Cancer Paternal Uncle Zach Siddiqui         Pancreatic       Social History     Tobacco Use   Smoking Status Never    Passive exposure: Never   Smokeless Tobacco Never       Wt Readings from Last 5 Encounters:   09/24/24 62.3 kg (137 lb 5.6 oz)   09/03/24 61 kg (134 lb 7.7 oz)   12/13/23 61.2 kg (135 lb)   08/29/23 60.9 kg (134 lb 4.2 oz)   08/31/22 59 kg (130 lb)       History of Present Illness    CHIEF COMPLAINT:  Chief Complaint    EPILEPSY:  She reports continued breakthrough seizures despite medication adjustments. During seizures, she experiences breathing difficulties, with her  observing a "banshee wailing" sound attributed to gasping for air. She feels unable to breathe when trying to sleep shortly after a seizure, causing her to wake up. She has been diagnosed with temporal lobe epilepsy by her new neurologist. The neurologist has ordered a brain MRI and EEG, but she had to cancel due to high out-of-pocket costs. She is working with her insurance company to find more affordable options. Her " oxcarbazepine dosage has been increased from 600 mg to 900 mg. The neurologist discussed potential seizure triggers with her, providing new information.    MUSCULOSKELETAL:  She reports a history of right shoulder dislocation, now occurring easily, with the most recent during her last seizure. She describes limited range of motion and difficulty performing certain movements, impacting her exercise routine. She is considering shoulder surgery to address these ongoing problems and improve her ability to maintain consistent exercise progress.    MEDICATIONS:  She takes Lexapro 10 mg daily with good effect and denies side effects. Her oxcarbazepine dosage has been increased from 600 mg to 900 mg, but she reports continued seizure activity.    SLEEP AND FATIGUE:  She reports improvement in fatigue since last visit, but still experiences daytime tiredness and sleepiness. She denies loud snoring since having her tonsils removed. She reports episodes of stopped breathing during sleep, but only in association with seizures. She denies blood pressure problems or waking up with headaches or dry mouth.    SUBSTANCE USE:  She reports consuming 10-15 alcoholic drinks per week, typically 4 drinks during weekdays and the remainder on weekends.    EXERCISE:  She reports setbacks in her exercise routine due to her shoulder injury. She prefers intense exercise but is currently limited by her right shoulder's range of motion and dislocations.    FAMILY PLANNING:  She is planning pregnancy within a year. She currently has an IUD and plans to have it removed in December.    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      She has a history of anxiety, managed  well with Lexapro 5 mg,        Objective:      Vitals:    09/24/24 1635   BP: 120/80   Pulse: 75   Temp: 96 °F (35.6 °C)       Physical Exam  Constitutional:       General: She is  not in acute distress.     Appearance: She is not ill-appearing.   Pulmonary:      Effort: Pulmonary effort is normal. No respiratory distress.   Neurological:      General: No focal deficit present.      Mental Status: She is alert.   Psychiatric:         Mood and Affect: Mood normal.         Behavior: Behavior normal.         Assessment:       1. Encounter for long-term (current) use of medications    2. Dislocation of right shoulder joint, subsequent encounter    3. Anxiety    4. History of dislocation of shoulder    5. Breakthrough seizure    6. Temporal lobe epilepsy        Plan:     Assessment & Plan    Reviewed patient's history of epilepsy and recent neurologist visit with Dr. Payne ( communicated issues w coverage w/ her neurologist).  Assessed current Lexapro regimen for anxiety, noting good response without side effects  Evaluated for obstructive sleep apnea using STOP-BANG questionnaire and Mallampati score, determining low risk  Considered shoulder dislocation issue, recommending orthopedic consultation  Noted patient's interest in pregnancy within the year, planning to review medication safety    - Added thyroid stimulating hormone (TSH) and lipid profile to neurologist's ordered labs.    ALCOHOL USE:  - Educated on recommended alcohol consumption limits for adult females.  - Patient to reduce alcohol consumption from current 10-15 drinks per week.    DEPRESSION:  - Continued Lexapro 10 mg daily.  - Refilled Lexapro, provided 90 tablets with 3 refills (1 year supply).  - Advised to maintain daily compliance with Lexapro.  - Instructed to taper off Lexapro if deciding to discontinue, not to stop abruptly.    FLU VACCINATION:  - Discussed importance of annual flu vaccination, especially given school work environment.  - Offered flu vaccine to be administered in office today.    SHOULDER INJURY:  - Referred to orthopedic department to evaluate right shoulder dislocation.    FAMILY PLANNING:  - Explained  potential need to adjust medications before pregnancy.  - Contact office before removing IUD in December to review medication safety for pregnancy.  - Contact office if deciding to try for pregnancy within the year.    FOLLOW UP:  - Follow up in 6 months.           This note was verbally dictated, please excuse any type errors.

## 2024-09-25 ENCOUNTER — IMMUNIZATION (OUTPATIENT)
Dept: INTERNAL MEDICINE | Facility: CLINIC | Age: 29
End: 2024-09-25
Payer: COMMERCIAL

## 2024-09-25 ENCOUNTER — LAB VISIT (OUTPATIENT)
Dept: LAB | Facility: HOSPITAL | Age: 29
End: 2024-09-25
Attending: PSYCHIATRY & NEUROLOGY
Payer: COMMERCIAL

## 2024-09-25 ENCOUNTER — PATIENT MESSAGE (OUTPATIENT)
Dept: INTERNAL MEDICINE | Facility: CLINIC | Age: 29
End: 2024-09-25

## 2024-09-25 DIAGNOSIS — Z79.899 ENCOUNTER FOR LONG-TERM (CURRENT) USE OF MEDICATIONS: ICD-10-CM

## 2024-09-25 DIAGNOSIS — G40.109 TEMPORAL LOBE EPILEPSY: ICD-10-CM

## 2024-09-25 DIAGNOSIS — G40.909 NOCTURNAL EPILEPSY: ICD-10-CM

## 2024-09-25 DIAGNOSIS — Z23 NEED FOR VACCINATION: Primary | ICD-10-CM

## 2024-09-25 LAB
ALBUMIN SERPL BCP-MCNC: 4.2 G/DL (ref 3.5–5.2)
ALP SERPL-CCNC: 66 U/L (ref 55–135)
ALT SERPL W/O P-5'-P-CCNC: 13 U/L (ref 10–44)
ANION GAP SERPL CALC-SCNC: 5 MMOL/L (ref 8–16)
AST SERPL-CCNC: 21 U/L (ref 10–40)
BASOPHILS # BLD AUTO: 0.1 K/UL (ref 0–0.2)
BASOPHILS NFR BLD: 1.4 % (ref 0–1.9)
BILIRUB SERPL-MCNC: 0.5 MG/DL (ref 0.1–1)
BUN SERPL-MCNC: 11 MG/DL (ref 6–20)
CALCIUM SERPL-MCNC: 9.2 MG/DL (ref 8.7–10.5)
CHLORIDE SERPL-SCNC: 103 MMOL/L (ref 95–110)
CHOLEST SERPL-MCNC: 181 MG/DL (ref 120–199)
CHOLEST/HDLC SERPL: 2.4 {RATIO} (ref 2–5)
CO2 SERPL-SCNC: 28 MMOL/L (ref 23–29)
CREAT SERPL-MCNC: 0.8 MG/DL (ref 0.5–1.4)
DIFFERENTIAL METHOD BLD: NORMAL
EOSINOPHIL # BLD AUTO: 0.1 K/UL (ref 0–0.5)
EOSINOPHIL NFR BLD: 1 % (ref 0–8)
ERYTHROCYTE [DISTWIDTH] IN BLOOD BY AUTOMATED COUNT: 12 % (ref 11.5–14.5)
EST. GFR  (NO RACE VARIABLE): >60 ML/MIN/1.73 M^2
GLUCOSE SERPL-MCNC: 87 MG/DL (ref 70–110)
HCT VFR BLD AUTO: 38.6 % (ref 37–48.5)
HDLC SERPL-MCNC: 76 MG/DL (ref 40–75)
HDLC SERPL: 42 % (ref 20–50)
HGB BLD-MCNC: 12.5 G/DL (ref 12–16)
IMM GRANULOCYTES # BLD AUTO: 0.02 K/UL (ref 0–0.04)
IMM GRANULOCYTES NFR BLD AUTO: 0.3 % (ref 0–0.5)
LDLC SERPL CALC-MCNC: 88 MG/DL (ref 63–159)
LYMPHOCYTES # BLD AUTO: 1.7 K/UL (ref 1–4.8)
LYMPHOCYTES NFR BLD: 23.3 % (ref 18–48)
MCH RBC QN AUTO: 29.8 PG (ref 27–31)
MCHC RBC AUTO-ENTMCNC: 32.4 G/DL (ref 32–36)
MCV RBC AUTO: 92 FL (ref 82–98)
MONOCYTES # BLD AUTO: 0.6 K/UL (ref 0.3–1)
MONOCYTES NFR BLD: 8.8 % (ref 4–15)
NEUTROPHILS # BLD AUTO: 4.7 K/UL (ref 1.8–7.7)
NEUTROPHILS NFR BLD: 65.2 % (ref 38–73)
NONHDLC SERPL-MCNC: 105 MG/DL
NRBC BLD-RTO: 0 /100 WBC
PLATELET # BLD AUTO: 339 K/UL (ref 150–450)
PMV BLD AUTO: 10.7 FL (ref 9.2–12.9)
POTASSIUM SERPL-SCNC: 4 MMOL/L (ref 3.5–5.1)
PROT SERPL-MCNC: 6.8 G/DL (ref 6–8.4)
RBC # BLD AUTO: 4.2 M/UL (ref 4–5.4)
SODIUM SERPL-SCNC: 136 MMOL/L (ref 136–145)
TRIGL SERPL-MCNC: 85 MG/DL (ref 30–150)
TSH SERPL DL<=0.005 MIU/L-ACNC: 1.3 UIU/ML (ref 0.4–4)
WBC # BLD AUTO: 7.16 K/UL (ref 3.9–12.7)

## 2024-09-25 PROCEDURE — 80053 COMPREHEN METABOLIC PANEL: CPT | Performed by: PSYCHIATRY & NEUROLOGY

## 2024-09-25 PROCEDURE — 84443 ASSAY THYROID STIM HORMONE: CPT | Performed by: FAMILY MEDICINE

## 2024-09-25 PROCEDURE — 36415 COLL VENOUS BLD VENIPUNCTURE: CPT | Performed by: PSYCHIATRY & NEUROLOGY

## 2024-09-25 PROCEDURE — 85025 COMPLETE CBC W/AUTO DIFF WBC: CPT | Performed by: PSYCHIATRY & NEUROLOGY

## 2024-09-25 PROCEDURE — 80183 DRUG SCRN QUANT OXCARBAZEPIN: CPT | Performed by: PSYCHIATRY & NEUROLOGY

## 2024-09-25 PROCEDURE — 90471 IMMUNIZATION ADMIN: CPT | Mod: S$GLB,,, | Performed by: FAMILY MEDICINE

## 2024-09-25 PROCEDURE — 90653 IIV ADJUVANT VACCINE IM: CPT | Mod: S$GLB,,, | Performed by: FAMILY MEDICINE

## 2024-09-25 PROCEDURE — 80061 LIPID PANEL: CPT | Performed by: FAMILY MEDICINE

## 2024-09-26 NOTE — PROGRESS NOTES
Orthopaedics Sports Medicine     Shoulder Initial Visit         10/1/2024    Referring MD: Mark Blakely MD    Chief Complaint   Patient presents with    Right Shoulder - Injury, Pain         History of Present Illness:   Tammie Siddiqui is a 29 y.o. right-hand dominant female who presents with right shoulder pain and dysfunction. Patient presents today on referral from PCP (Dr. Blakely) who initially saw her for this issue on 9/24/24 at which time she reported a history of recurrent right shoulder dislocations, now occurring easily, with the most recent during her last seizure. She described limited range of motion and difficulty performing certain movements, impacting her exercise routine. She was considering shoulder surgery to address these ongoing problems and improve her ability to maintain consistent exercise progress. She was referred to orthopedics. Of note she suffers from epilepsy. Most recent seizure was approximately 1 month ago, reports prior to that it had been 6-7 months.     Current symptoms include right shoulder recurrent instability and limited range of motion of her shoulder. She also reports apprehension with certain positions as well.  She estimates approximately 10-11 instability episodes. She reports some dislocations she is able to reduce on her own but some she has to go to the ER. She reports 0/10 pain today but reports it does limit her with certain desired activities.     Pain is aggravated by certain movements.       Evaluation to date: X-Ray,      Treatment to date: Rest, activity modification, physical therapy      Past Medical History:   Past Medical History:   Diagnosis Date    Anxiety     Epilepsy     History of abnormal cervical Pap smear     Migraine        Past Surgical History:   Past Surgical History:   Procedure Laterality Date    TONSILLECTOMY      WISDOM TOOTH EXTRACTION         Medications:  Patient's Medications   New Prescriptions    No medications on file   Previous  "Medications    ESCITALOPRAM OXALATE (LEXAPRO) 10 MG TABLET    Take 1 tablet (10 mg total) by mouth once daily.    OXCARBAZEPINE (OXTELLAR XR) 300 MG TB24    Take 300 mg by mouth every evening.    OXCARBAZEPINE (OXTELLAR XR) 600 MG TB24    Take 1 tablet by mouth every evening.    TRETINOIN (RETIN-A) 0.025 % CREAM    Apply topically every evening. Pea sized amount to entire face at night. If irritation occurs, decrease use to every other day.   Modified Medications    No medications on file   Discontinued Medications    No medications on file       Allergies: Review of patient's allergies indicates:  No Known Allergies    Social History:   Home town: [place], [place]  Occupation: Teacher at [employer]  Alcohol use: She reports current alcohol use of about 9.0 standard drinks of alcohol per week.  Tobacco use: She reports that she has never smoked. She has never been exposed to tobacco smoke. She has never used smokeless tobacco.    Review of systems:  History of recent illness, fevers, shakes, or chills: no  History of cardiac problems or chest pain: no  History of pulmonary problems or asthma: no  History of diabetes: no  History of prior dvt or clotting problems: no  History of sleep apnea: no      Physical Examination:  Estimated body mass index is 22.17 kg/m² as calculated from the following:    Height as of this encounter: 5' 6" (1.676 m).    Weight as of this encounter: 62.3 kg (137 lb 5.6 oz).    General  Healthy appearing female in no acute distress  Alert and oriented, normal mood, appropriate affect    Shoulder Examination:  Patient is alert and oriented, no distress. Skin is intact. Neuro is normal with no focal motor or sensory findings.    Cervical exam is unremarkable. Intact cervical ROM. Negative Spurling's test    Physical Exam:  RIGHT    LEFT    Scap Dyskinesis/Winging (-)    (-)    Tenderness:          Greater Tuberosity             (-)    (-)  Bicipital Groove  (-)    (-)  AC " joint   (-)    (-)  Other:     ROM:  Forward Elevation 180    180  Abduction  120    120  ER (at side)  80    80  IR   T8    T8    Strength:   Supraspinatus  5/5    5/5  Infraspinatus  5/5    5/5  Subscap / IR  5/5    5/5     Special Tests:   Apprehension:   +    (-)   Antionette Relocation:  +    (-)   Jerk / Posterior Load:  (-)    (-)   Neer:    (-)    (-)   Balderas:   (-)    (-)   SS Stress:   (-)    (-)   Bear Hug:   (-)    (-)   Sibley's:   (-)    (-)   Resisted Thrower's:   (-)    (-)   Speed's   (-)    (-)   Cross Arm Abduction:  (-)    (-)    Reports apprehension with ABIR positioning.     Neurovascular examination  - Motor grossly intact bilaterally to shoulder abduction, elbow flexion and extension, wrist flexion and extension, and intrinsic hand musculature  - Sensation intact to light touch bilaterally in axillary, median, radial, and ulnar distributions  - Symmetrical radial pulses      Imaging:  XR Results:    X-ray Shoulder 2 or More Views Right  Narrative: EXAMINATION:  XR SHOULDER COMPLETE 2 OR MORE VIEWS RIGHT    CLINICAL HISTORY:  Pain in right shoulder    TECHNIQUE:  XR SHOULDER COMPLETE 2 OR MORE VIEWS RIGHT    COMPARISON:  Chest x-ray from 08/29/2024.    FINDINGS:  No evidence of acute fracture or dislocation.  No radiopaque foreign body seen.    Incidental note is made of a density projecting over the right upper lobe.  Impression: 1. No evidence of acute fracture or dislocation.  2. Incidental note is made of a nodular opacity projecting of the right upper lobe.  This finding appears similar to the study from 08/29/2023.  Consider follow-up evaluation with CT.    Electronically signed by: Jeremiah Muñoz  Date:    10/01/2024  Time:    16:06       MRI Results:  No results found for this or any previous visit.      CT Results:  No results found for this or any previous visit.      Physician Read: I agree with the above impression.      Impression:  29 y.o. female with right shoulder recurrent  instability, Hill-Sachs deformity, glenoid labral tear       Plan:  Discussed diagnosis and treatment options with patient today. She has recurrent shoulder instability of her right shoulder with estimated 10-11 events. She has evidence of Hill-Sachs impaction on XR as well. She also suffers from epilepsy with grand mal seizures.  Most recent seizure 1 month ago.  We talked about the natural history of recurrent shoulder dislocations.  In any shoulder dislocation event 95% of patients will have a labral tear.  With recurrent shoulder instability, the likelihood of glenoid bone loss also increases.  This makes redislocation easier and generally needs to be addressed with any type of operative treatment.  Also informed her that recurrent shoulder instability is generally treated operatively with arthroscopic labral repair and remplissage, versus potential Latarjet procedure depending on the amount of bone loss that may be present.   We also discussed the difficulties of operative treatment in treating shoulder instability in the setting of epilepsy. She is interested in potential operative treatment but due to her recent seizure would defer any operative treatment until she has been seizure free for at least 3 months.   Discussed that if she would ultimately like to move forward with operative treatment, I would like to obtain an MRI and CT scan for further evaluation and operative planning.   Follow up with me after imaging.            Steve Love MD    I, Jose Leal, acted as a scribe for Steve Love MD for the duration of this office visit.

## 2024-09-28 LAB — OXCARBAZEPINE METABOLITE: 13 MCG/ML (ref 10–35)

## 2024-09-30 ENCOUNTER — PATIENT MESSAGE (OUTPATIENT)
Dept: SPORTS MEDICINE | Facility: CLINIC | Age: 29
End: 2024-09-30
Payer: COMMERCIAL

## 2024-09-30 ENCOUNTER — TELEPHONE (OUTPATIENT)
Dept: SPORTS MEDICINE | Facility: CLINIC | Age: 29
End: 2024-09-30
Payer: COMMERCIAL

## 2024-09-30 DIAGNOSIS — M25.511 RIGHT SHOULDER PAIN, UNSPECIFIED CHRONICITY: Primary | ICD-10-CM

## 2024-10-01 ENCOUNTER — HOSPITAL ENCOUNTER (OUTPATIENT)
Dept: RADIOLOGY | Facility: HOSPITAL | Age: 29
Discharge: HOME | End: 2024-10-01
Attending: STUDENT IN AN ORGANIZED HEALTH CARE EDUCATION/TRAINING PROGRAM
Payer: COMMERCIAL

## 2024-10-01 ENCOUNTER — PATIENT MESSAGE (OUTPATIENT)
Dept: SPORTS MEDICINE | Facility: CLINIC | Age: 29
End: 2024-10-01

## 2024-10-01 ENCOUNTER — OFFICE VISIT (OUTPATIENT)
Dept: SPORTS MEDICINE | Facility: CLINIC | Age: 29
End: 2024-10-01
Payer: COMMERCIAL

## 2024-10-01 VITALS — HEIGHT: 66 IN | WEIGHT: 137.38 LBS | BODY MASS INDEX: 22.08 KG/M2

## 2024-10-01 DIAGNOSIS — G89.29 CHRONIC RIGHT SHOULDER PAIN: Primary | ICD-10-CM

## 2024-10-01 DIAGNOSIS — S42.291A HILL SACHS DEFORMITY, RIGHT: ICD-10-CM

## 2024-10-01 DIAGNOSIS — M25.511 RIGHT SHOULDER PAIN, UNSPECIFIED CHRONICITY: ICD-10-CM

## 2024-10-01 DIAGNOSIS — M25.511 CHRONIC RIGHT SHOULDER PAIN: Primary | ICD-10-CM

## 2024-10-01 DIAGNOSIS — G40.109 TEMPORAL LOBE EPILEPSY: Primary | ICD-10-CM

## 2024-10-01 DIAGNOSIS — G40.909 NOCTURNAL EPILEPSY: ICD-10-CM

## 2024-10-01 DIAGNOSIS — M25.311 SHOULDER INSTABILITY, RIGHT: ICD-10-CM

## 2024-10-01 DIAGNOSIS — S43.431A TEAR OF RIGHT GLENOID LABRUM, INITIAL ENCOUNTER: ICD-10-CM

## 2024-10-01 PROCEDURE — 99999 PR PBB SHADOW E&M-EST. PATIENT-LVL IV: CPT | Mod: PBBFAC,,, | Performed by: STUDENT IN AN ORGANIZED HEALTH CARE EDUCATION/TRAINING PROGRAM

## 2024-10-01 PROCEDURE — 73030 X-RAY EXAM OF SHOULDER: CPT | Mod: 26,RT,, | Performed by: STUDENT IN AN ORGANIZED HEALTH CARE EDUCATION/TRAINING PROGRAM

## 2024-10-01 PROCEDURE — 1159F MED LIST DOCD IN RCRD: CPT | Mod: CPTII,S$GLB,, | Performed by: STUDENT IN AN ORGANIZED HEALTH CARE EDUCATION/TRAINING PROGRAM

## 2024-10-01 PROCEDURE — 99204 OFFICE O/P NEW MOD 45 MIN: CPT | Mod: S$GLB,,, | Performed by: STUDENT IN AN ORGANIZED HEALTH CARE EDUCATION/TRAINING PROGRAM

## 2024-10-01 PROCEDURE — 1160F RVW MEDS BY RX/DR IN RCRD: CPT | Mod: CPTII,S$GLB,, | Performed by: STUDENT IN AN ORGANIZED HEALTH CARE EDUCATION/TRAINING PROGRAM

## 2024-10-01 PROCEDURE — 73030 X-RAY EXAM OF SHOULDER: CPT | Mod: TC,RT

## 2024-10-01 PROCEDURE — 3008F BODY MASS INDEX DOCD: CPT | Mod: CPTII,S$GLB,, | Performed by: STUDENT IN AN ORGANIZED HEALTH CARE EDUCATION/TRAINING PROGRAM

## 2024-10-07 RX ORDER — OXCARBAZEPINE 300 MG/1
300 TABLET ORAL NIGHTLY
Qty: 90 TABLET | Refills: 3 | Status: SHIPPED | OUTPATIENT
Start: 2024-10-07

## 2024-10-07 RX ORDER — OXCARBAZEPINE 600 MG/1
1 TABLET ORAL NIGHTLY
Qty: 90 TABLET | Refills: 3 | Status: SHIPPED | OUTPATIENT
Start: 2024-10-07

## 2024-10-09 ENCOUNTER — PATIENT MESSAGE (OUTPATIENT)
Dept: INTERNAL MEDICINE | Facility: CLINIC | Age: 29
End: 2024-10-09
Payer: COMMERCIAL

## 2024-10-09 ENCOUNTER — PATIENT MESSAGE (OUTPATIENT)
Dept: NEUROLOGY | Facility: CLINIC | Age: 29
End: 2024-10-09
Payer: COMMERCIAL

## 2024-12-01 ENCOUNTER — PATIENT MESSAGE (OUTPATIENT)
Dept: INTERNAL MEDICINE | Facility: CLINIC | Age: 29
End: 2024-12-01
Payer: COMMERCIAL

## 2024-12-05 RX ORDER — OXCARBAZEPINE 600 MG/1
1 TABLET, EXTENDED RELEASE ORAL NIGHTLY
Qty: 90 TABLET | Refills: 3 | Status: SHIPPED | OUTPATIENT
Start: 2024-12-05

## 2024-12-05 RX ORDER — OXCARBAZEPINE 300 MG/1
300 TABLET, EXTENDED RELEASE ORAL NIGHTLY
Qty: 90 TABLET | Refills: 3 | Status: SHIPPED | OUTPATIENT
Start: 2024-12-05

## 2024-12-09 ENCOUNTER — OFFICE VISIT (OUTPATIENT)
Dept: INTERNAL MEDICINE | Facility: CLINIC | Age: 29
End: 2024-12-09
Payer: COMMERCIAL

## 2024-12-09 VITALS
SYSTOLIC BLOOD PRESSURE: 116 MMHG | OXYGEN SATURATION: 98 % | DIASTOLIC BLOOD PRESSURE: 74 MMHG | HEIGHT: 66 IN | HEART RATE: 89 BPM | TEMPERATURE: 96 F | WEIGHT: 138.44 LBS | RESPIRATION RATE: 16 BRPM | BODY MASS INDEX: 22.25 KG/M2

## 2024-12-09 DIAGNOSIS — L65.8 FEMALE PATTERN HAIR LOSS: Primary | ICD-10-CM

## 2024-12-09 DIAGNOSIS — Z87.39 HISTORY OF DISLOCATION OF SHOULDER: ICD-10-CM

## 2024-12-09 DIAGNOSIS — L70.0 ACNE VULGARIS: ICD-10-CM

## 2024-12-09 DIAGNOSIS — G40.919 BREAKTHROUGH SEIZURE: ICD-10-CM

## 2024-12-09 DIAGNOSIS — F41.9 ANXIETY: ICD-10-CM

## 2024-12-09 PROCEDURE — 99214 OFFICE O/P EST MOD 30 MIN: CPT | Mod: S$GLB,,, | Performed by: FAMILY MEDICINE

## 2024-12-09 PROCEDURE — 3008F BODY MASS INDEX DOCD: CPT | Mod: CPTII,S$GLB,, | Performed by: FAMILY MEDICINE

## 2024-12-09 PROCEDURE — 1159F MED LIST DOCD IN RCRD: CPT | Mod: CPTII,S$GLB,, | Performed by: FAMILY MEDICINE

## 2024-12-09 PROCEDURE — 3078F DIAST BP <80 MM HG: CPT | Mod: CPTII,S$GLB,, | Performed by: FAMILY MEDICINE

## 2024-12-09 PROCEDURE — 3074F SYST BP LT 130 MM HG: CPT | Mod: CPTII,S$GLB,, | Performed by: FAMILY MEDICINE

## 2024-12-09 PROCEDURE — 99999 PR PBB SHADOW E&M-EST. PATIENT-LVL III: CPT | Mod: PBBFAC,,, | Performed by: FAMILY MEDICINE

## 2024-12-09 PROCEDURE — G2211 COMPLEX E/M VISIT ADD ON: HCPCS | Mod: S$GLB,,, | Performed by: FAMILY MEDICINE

## 2024-12-09 RX ORDER — MULTIVITAMIN
1 TABLET ORAL DAILY
COMMUNITY

## 2024-12-09 NOTE — PROGRESS NOTES
Subjective:       Patient ID: Tammie Siddiqui is a 29 y.o. female.    Chief Complaint: Anxiety (Wants to check medications due to trying to get pregnant)      Patient Active Problem List   Diagnosis    History of dislocation of shoulder    Temporal lobe epilepsy    Breakthrough seizure    Nocturnal epilepsy       Past Medical History:   Diagnosis Date    Anxiety     Epilepsy     History of abnormal cervical Pap smear     Migraine        Past Surgical History:   Procedure Laterality Date    TONSILLECTOMY      WISDOM TOOTH EXTRACTION         Family History   Problem Relation Name Age of Onset    Dementia Maternal Grandmother          alzheimer's    Heart disease Paternal Grandfather Tyler Siddiqui     Pancreatic cancer Paternal Uncle      Seizures Cousin      Alcohol abuse Father Pecry Siddiqui     Cancer Father Percy Siddiqui         Prostate    Depression Father Percy Siddiqui     Mental illness Paternal Grandmother Maddison Siddiqui         Bipolar    Cancer Paternal Uncle Zach Siddiqui         Pancreatic       Social History     Tobacco Use   Smoking Status Never    Passive exposure: Never   Smokeless Tobacco Never       Wt Readings from Last 5 Encounters:   12/09/24 62.8 kg (138 lb 7.2 oz)   10/01/24 62.3 kg (137 lb 5.6 oz)   09/24/24 62.3 kg (137 lb 5.6 oz)   09/03/24 61 kg (134 lb 7.7 oz)   12/13/23 61.2 kg (135 lb)     History of Present Illness    CHIEF COMPLAINT:  Patient presents today for medication review before trying to conceive.    MEDICATIONS AND EPILEPSY MANAGEMENT:  She is currently taking Lexapro 5mg for anxiety and reports feeling fine, expressing interest in discontinuing the medication. For epilepsy, she takes oxcarbazepine, recently increased from 600mg to 900mg daily due to continued seizure activity. She still experiences seizures with certain triggers such as alcohol consumption, dehydration, sun exposure, and lack of sleep. Her seizures are described as grand mal and nocturnal,  with focal epilepsy. An EEG was recently performed with results pending. She uses topical tretinoin for cosmetic purposes.    REPRODUCTIVE PLANNING:  She has an IUD removal scheduled in approximately one week and plans to try for pregnancy afterwards. She is not currently pregnant and plans to use condoms or other forms of birth control until ready for pregnancy    MUSCULOSKELETAL CONCERNS:  She reports recurrent shoulder instability with a history of Hill-Sachs deformity and glenoid labral tear. She experiences the shoulder popping out of socket with certain movements but denies associated pain. An MRI was recommended by the orthopedic surgeon but has not been performed due to logistical and financial considerations.    HAIR LOSS:  She reports concerns about hair thinning and has been taking Nutrafol for 8 months with uncertain effectiveness. A dermatologist consultation found no significant issues and no clear cause was identified. She did not receive any specific diagnosis or treatment recommendations from the dermatologist regarding her hair loss concerns.         Objective:      Vitals:    12/09/24 1053   BP: 116/74   Pulse: 89   Resp: 16   Temp: 96.2 °F (35.7 °C)       Physical Exam  Constitutional:       General: She is not in acute distress.     Appearance: She is not ill-appearing.   Pulmonary:      Effort: Pulmonary effort is normal. No respiratory distress.   Neurological:      General: No focal deficit present.      Mental Status: She is alert.   Psychiatric:         Mood and Affect: Mood normal.         Behavior: Behavior normal.         Assessment:       1. Female pattern hair loss    2. Anxiety    3. History of dislocation of shoulder    4. Acne vulgaris    5. Breakthrough seizure        Plan:     Assessment & Plan    PLAN SUMMARY:  Discontinue Lexapro 5mg; taper by taking every other day for 7-10 days, then stop  Discontinue Retin-A and Neutrofil immediately in preparation for pregnancy  Continue  Oxcarbazepine 900mg daily for focal epilepsy until get better guidance from neuro  Transition from current multivitamin to prenatal vitamin  Use condoms or other form of birth control until medication regimen is optimized for pregnancy  Referred to neurology for evaluation and management of anti-seizure medication regimen  Follow up in 1 year if no concerns arise  Contact office if experiencing anxiety issues after Lexapro discontinuation or to provide pregnancy status update    EPILEPSY:  Continued Oxcarbazepine 900mg daily for epilepsy; requires further evaluation for safety during pregnancy.  Pending neurology consultation.  Deferred to neurology for guidance on anti-seizure medication management pre-conception.  Discussed potential risks of anti-seizure medications during pregnancy, including increased risk of birth defects.  Explained importance of folic acid supplementation, especially for patients with seizure disorders.  Referred to neurology for evaluation and management of anti-seizure medication regimen in preparation for pregnancy.    ANXIETY MANAGEMENT:  Discontinued Lexapro 5mg due to planned pregnancy; patient reports anxiety well-controlled.  Taper by taking every other day for 7-10 days, then stop.  Contact office if experiencing anxiety issues after Lexapro discontinuation.    PREGNANCY COUNSELING:  Consulted Up to Date for medication safety during pregnancy.  Advised discontinuation of cosmetic products (Retin-A, Neutrofil) due to unknown safety profile in pregnancy.  Patient to stop using Retin-A for cosmetic purposes immediately.  Patient to discontinue use of Neutrofil hair supplement given uncertainity  Recommend transition from current multivitamin to prenatal vitamin.  Patient to transition from current multivitamin to prenatal vitamin.  Informed patient about restrictions on cosmetic products (retinoids, Botox) during pregnancy.  Patient to use condoms or other form of birth control until  medication regimen is optimized for pregnancy.    GENERAL HEALTH MONITORING:  Reviewed recent labs; cholesterol, metabolic panel, and blood counts within normal limits.    FOLLOW-UP:  Contact office if any problems arise or to provide pregnancy status update.  Follow up in 1 year if no concerns arise.

## 2024-12-16 ENCOUNTER — TELEPHONE (OUTPATIENT)
Dept: NEUROLOGY | Facility: CLINIC | Age: 29
End: 2024-12-16
Payer: COMMERCIAL

## 2024-12-16 NOTE — TELEPHONE ENCOUNTER
----- Message from Snehal Payne MD sent at 2024 12:15 PM CST -----      10-     EEG  A/S NL. Interpreted on 2024 and addendum to 2024 note.             DATE EEG PERFORMED:  10-        DATE EEG INTERPRETED:  2024                    DURATION OF EE MINUTES           LEVEL OF CONSCIOUSENESS     Awake and Sleep.            EEG BACKGROUND     The posterior dominant basic rhythm reaches 9-10 Hz, symmetric, reactive, well-modulated and well-sustained.            EEG CLASSIFICATION     Normal           IMPRESSION        The EEG is normal in the awake and sleep states.         There are no epileptiform discharges or lateralizing signs. No typical events were recorded. There is no electrographic evidence of seizure.There is no electrographic evidence of status epilepticus.            PLEASE NOTE THAT A NON-EPILEPTIFORM EEG DOES NOT RULE OUT EPILEPSY.           SNEHAL PAYNE MD, FAAN     Diplomate, American Board of Psychiatry and Neurology     Diplomate, American Board of Clinical Neurophysiology

## 2024-12-17 ENCOUNTER — PATIENT MESSAGE (OUTPATIENT)
Dept: NEUROLOGY | Facility: CLINIC | Age: 29
End: 2024-12-17
Payer: COMMERCIAL

## 2024-12-17 ENCOUNTER — PATIENT MESSAGE (OUTPATIENT)
Dept: INTERNAL MEDICINE | Facility: CLINIC | Age: 29
End: 2024-12-17
Payer: COMMERCIAL

## 2025-01-14 ENCOUNTER — OFFICE VISIT (OUTPATIENT)
Dept: NEUROLOGY | Facility: CLINIC | Age: 30
End: 2025-01-14
Payer: COMMERCIAL

## 2025-01-14 VITALS
HEIGHT: 66 IN | RESPIRATION RATE: 16 BRPM | WEIGHT: 138.88 LBS | SYSTOLIC BLOOD PRESSURE: 133 MMHG | DIASTOLIC BLOOD PRESSURE: 84 MMHG | HEART RATE: 61 BPM | BODY MASS INDEX: 22.32 KG/M2

## 2025-01-14 DIAGNOSIS — G40.909 NOCTURNAL EPILEPSY: ICD-10-CM

## 2025-01-14 DIAGNOSIS — G40.109 TEMPORAL LOBE EPILEPSY: Primary | ICD-10-CM

## 2025-01-14 DIAGNOSIS — Z87.39 HISTORY OF DISLOCATION OF SHOULDER: ICD-10-CM

## 2025-01-14 PROBLEM — G40.919 BREAKTHROUGH SEIZURE: Status: RESOLVED | Noted: 2024-09-03 | Resolved: 2025-01-14

## 2025-01-14 PROCEDURE — 1159F MED LIST DOCD IN RCRD: CPT | Mod: CPTII,S$GLB,, | Performed by: PSYCHIATRY & NEUROLOGY

## 2025-01-14 PROCEDURE — G2211 COMPLEX E/M VISIT ADD ON: HCPCS | Mod: S$GLB,,, | Performed by: PSYCHIATRY & NEUROLOGY

## 2025-01-14 PROCEDURE — 3075F SYST BP GE 130 - 139MM HG: CPT | Mod: CPTII,S$GLB,, | Performed by: PSYCHIATRY & NEUROLOGY

## 2025-01-14 PROCEDURE — 99215 OFFICE O/P EST HI 40 MIN: CPT | Mod: S$GLB,,, | Performed by: PSYCHIATRY & NEUROLOGY

## 2025-01-14 PROCEDURE — 99999 PR PBB SHADOW E&M-EST. PATIENT-LVL IV: CPT | Mod: PBBFAC,,, | Performed by: PSYCHIATRY & NEUROLOGY

## 2025-01-14 PROCEDURE — 3079F DIAST BP 80-89 MM HG: CPT | Mod: CPTII,S$GLB,, | Performed by: PSYCHIATRY & NEUROLOGY

## 2025-01-14 PROCEDURE — 3008F BODY MASS INDEX DOCD: CPT | Mod: CPTII,S$GLB,, | Performed by: PSYCHIATRY & NEUROLOGY

## 2025-01-14 RX ORDER — LEVETIRACETAM 500 MG/1
2000 TABLET, EXTENDED RELEASE ORAL NIGHTLY
Qty: 360 TABLET | Refills: 3 | Status: SHIPPED | OUTPATIENT
Start: 2025-01-14 | End: 2025-01-23 | Stop reason: SINTOL

## 2025-01-14 NOTE — PROGRESS NOTES
Subjective:       Patient ID: Tammie Siddiqui is a 29 y.o. female.    Chief Complaint: Temporal lobe epilepsy          HPI      BACKGROUND HISTORY       The patient presented on 09- for evaluation of epilepsy.      The patient started having seizures on 05-. The first seizure occurred during sleep on the sofa after few drinks. The seizure, like all her seizures, was described as grand mal seizure/GTC consisting of generalized tensing and muscle jerking with eyes deviated upwards with foamy secretions from the mouth, tongue biting and urine incontinence followed by post-ictal confusion and RT shoulder dislocation.  The patient  denied history of Stroke, TBI, Meningitis or Febrile Seizures. Her maternal first cousin had childhood epilepsy.  Previous EEGs showed LT TLE. The patient tends to have annual seizures  (last seizure was on 09-) and they always happen after sleep deprivation, excessive alcohol drinking, dehydration and THC smoking and all the seizures are GTCs and nocturnal. Tried LTG in the past before switching to OXC  mg QHS. Ordered EEG, Brain MRI and changed OXC XR to 900 mg QHS. On 10- EEG  A/S NL, could not afford MRI and AEEG.      INTERVAL HISTORY       The patient presented on 01- for follow up evaluation of epilepsy.    No seizures since 09-. The patient is planning pregnancy and wanted to discuss her options.      Review of Systems   Constitutional:  Negative for appetite change and fatigue.   HENT:  Negative for hearing loss and tinnitus.    Eyes:  Negative for photophobia and visual disturbance.   Respiratory:  Negative for apnea and shortness of breath.    Cardiovascular:  Negative for chest pain and palpitations.   Gastrointestinal:  Negative for nausea and vomiting.   Endocrine: Negative for cold intolerance and heat intolerance.   Genitourinary:  Negative for difficulty urinating and urgency.   Musculoskeletal:  Negative for arthralgias, back  pain, gait problem, joint swelling, myalgias, neck pain and neck stiffness.   Skin:  Negative for color change and rash.   Allergic/Immunologic: Negative for environmental allergies and immunocompromised state.   Neurological:  Positive for seizures. Negative for dizziness, tremors, syncope, facial asymmetry, speech difficulty, weakness, light-headedness, numbness and headaches.   Hematological:  Negative for adenopathy. Does not bruise/bleed easily.   Psychiatric/Behavioral:  Negative for agitation, behavioral problems, confusion, decreased concentration, dysphoric mood, hallucinations, self-injury, sleep disturbance and suicidal ideas. The patient is nervous/anxious. The patient is not hyperactive.                Current Outpatient Medications:     multivitamin (THERAGRAN) per tablet, Take 1 tablet by mouth once daily., Disp: , Rfl:     OXcarbazepine (OXTELLAR XR) 300 mg Tb24, Take 300 mg by mouth every evening., Disp: 90 tablet, Rfl: 3    OXcarbazepine (OXTELLAR XR) 600 mg Tb24, Take 1 tablet by mouth every evening., Disp: 90 tablet, Rfl: 3    prenatal 25/iron fum/folic/dha (PRENATAL-1 ORAL), Take by mouth., Disp: , Rfl:     Past Medical History:   Diagnosis Date    Anxiety     Epilepsy     History of abnormal cervical Pap smear     Migraine        Past Surgical History:   Procedure Laterality Date    TONSILLECTOMY      WISDOM TOOTH EXTRACTION         Social History     Socioeconomic History    Marital status: Unknown   Tobacco Use    Smoking status: Never     Passive exposure: Never    Smokeless tobacco: Never   Substance and Sexual Activity    Alcohol use: Yes     Alcohol/week: 9.0 standard drinks of alcohol     Types: 2 Glasses of wine, 5 Cans of beer, 2 Shots of liquor per week    Drug use: Never    Sexual activity: Yes     Partners: Male     Birth control/protection: Condom, I.U.D.     Social Drivers of Health     Financial Resource Strain: Patient Declined (3/15/2024)    Overall Financial Resource Strain  (CARDIA)     Difficulty of Paying Living Expenses: Patient declined   Food Insecurity: Patient Declined (3/15/2024)    Hunger Vital Sign     Worried About Running Out of Food in the Last Year: Patient declined     Ran Out of Food in the Last Year: Patient declined   Transportation Needs: Patient Declined (3/15/2024)    PRAPARE - Transportation     Lack of Transportation (Medical): Patient declined     Lack of Transportation (Non-Medical): Patient declined   Physical Activity: Patient Declined (3/15/2024)    Exercise Vital Sign     Days of Exercise per Week: Patient declined     Minutes of Exercise per Session: Patient declined   Stress: Patient Declined (3/15/2024)    [employer] [employer] - Occupational Stress Questionnaire     Feeling of Stress : Patient declined   Housing Stability: Patient Declined (3/15/2024)    Housing Stability Vital Sign     Unable to Pay for Housing in the Last Year: Patient declined     Unstable Housing in the Last Year: Patient declined             Past/Current Medical/Surgical History, Past/Current Social History, Past/Current Family History and Past/Current Medications were reviewed in detail.        Objective:           VITAL SIGNS WERE REVIEWED      GENERAL APPEARANCE:     The patient looks comfortable.    BMI 21.71    No signs of respiratory distress.    Normal breathing pattern.    No dysmorphic features    Normal eye contact.       GENERAL MEDICAL EXAM:    HEENT:  Head is atraumatic normocephalic. Evidence of tongue biting.    FUNDUSCOPIC (OPHTHALMOSCOPIC) EXAMINATION showed no disc edema (papilledema).      NECK: No JVD. No visible lesions or goiters.     CHEST-CARDIOPULMONARY: No cyanosis. No tachypnea. Normal respiratory effort.    QJTRAJD-[id number]-[id number]: No jaundice. No stomas or lesions. No visible hernias. No catheters.     SKIN, HAIR, NAILS: No pathognomonic skin rash.No neurofibromatosis. No visible lesions.No stigmata of autoimmune disease.  No clubbing.    LIMBS: No varicose veins. No visible swelling.    MUSCULOSKELETAL: No visible deformities.No visible lesions. Decreased ROM RT shoulder.              Neurologic Exam     Mental Status   Oriented to person, place, and time.   Follows 3 step commands.   Attention: normal. Concentration: normal.   Speech: speech is normal   Level of consciousness: alert  Able to name object. Able to repeat. Normal comprehension.     Cranial Nerves   Cranial nerves II through XII intact.     CN II   Visual fields full to confrontation.   Visual acuity: normal  Right visual field deficit: none  Left visual field deficit: none     CN III, IV, VI   Pupils are equal, round, and reactive to light.  Extraocular motions are normal.   Right pupil: Size: 2 mm. Shape: regular. Reactivity: brisk. Consensual response: intact. Accommodation: intact.   Left pupil: Size: 2 mm. Shape: regular. Reactivity: brisk. Consensual response: intact. Accommodation: intact.   CN III: no CN III palsy  CN VI: no CN VI palsy  Nystagmus: none   Diplopia: none  Ophthalmoparesis: none  Upgaze: normal  Downgaze: normal  Conjugate gaze: present  Vestibulo-ocular reflex: present    CN V   Facial sensation intact.   Right facial sensation deficit: none  Left facial sensation deficit: none  Jaw jerk: normal    CN VII   Facial expression full, symmetric.   Right facial weakness: none  Left facial weakness: none    CN VIII   CN VIII normal.   Hearing: intact    CN IX, X   CN IX normal.   CN X normal.   Palate: symmetric    CN XI   CN XI normal.   Right sternocleidomastoid strength: normal  Left sternocleidomastoid strength: normal  Right trapezius strength: normal  Left trapezius strength: normal    CN XII   CN XII normal.   Tongue: not atrophic  Fasciculations: absent  Tongue deviation: none    Motor Exam   Muscle bulk: normal  Overall muscle tone: normal  Right arm tone: normal  Left arm tone: normal  Right arm pronator drift: absent  Left arm pronator  drift: absent  Right leg tone: normal  Left leg tone: normal    Strength   Strength 5/5 throughout.   Right neck flexion: 5/5  Left neck flexion: 5/5  Right neck extension: 5/5  Left neck extension: 5/5  Right deltoid: 5/5  Left deltoid: 5/5  Right biceps: 5/5  Left biceps: 5/5  Right triceps: 5/5  Left triceps: 5/5  Right wrist flexion: 5/5  Left wrist flexion: 5/5  Right wrist extension: 5/5  Left wrist extension: 5/5  Right interossei: 5/5  Left interossei: 5/5  Right iliopsoas: 5/5  Left iliopsoas: 5/5  Right quadriceps: 5/5  Left quadriceps: 5/5  Right hamstrin/5  Left hamstrin/5  Right glutei: 5/5  Left glutei: 5/5  Right anterior tibial: 5/5  Left anterior tibial: 5/5  Right posterior tibial: 5/5  Left posterior tibial: 5/5  Right peroneal: 5/5  Left peroneal: 5/5  Right gastroc: 5/5  Left gastroc: 5/5    Sensory Exam   Light touch normal.   Right arm light touch: normal  Left arm light touch: normal  Right leg light touch: normal  Left leg light touch: normal  Vibration normal.   Right arm vibration: normal  Left arm vibration: normal  Right leg vibration: normal  Left leg vibration: normal  Proprioception normal.   Right arm proprioception: normal  Left arm proprioception: normal  Right leg proprioception: normal  Left leg proprioception: normal  Pinprick normal.   Right arm pinprick: normal  Left arm pinprick: normal  Right leg pinprick: normal  Left leg pinprick: normal  Graphesthesia: normal  Stereognosis: normal    Gait, Coordination, and Reflexes     Gait  Gait: normal    Coordination   Romberg: negative  Finger to nose coordination: normal  Heel to shin coordination: normal  Tandem walking coordination: normal    Tremor   Resting tremor: absent  Intention tremor: absent  Action tremor: absent    Reflexes   Right brachioradialis: 2+  Left brachioradialis: 2+  Right biceps: 2+  Left biceps: 2+  Right triceps: 2+  Left triceps: 2+  Right patellar: 2+  Left patellar: 2+  Right achilles: 2+  Left  achilles: 2+  Right plantar: normal  Left plantar: normal  Right Pa: absent  Left Pa: absent  Right ankle clonus: absent  Left ankle clonus: absent  Right pendular knee jerk: absent  Left pendular knee jerk: absent      Lab Results   Component Value Date    WBC 7.16 09/25/2024    HGB 12.5 09/25/2024    HCT 38.6 09/25/2024    MCV 92 09/25/2024     09/25/2024       Sodium   Date Value Ref Range Status   09/25/2024 136 136 - 145 mmol/L Final     Potassium   Date Value Ref Range Status   09/25/2024 4.0 3.5 - 5.1 mmol/L Final     Chloride   Date Value Ref Range Status   09/25/2024 103 95 - 110 mmol/L Final     CO2   Date Value Ref Range Status   09/25/2024 28 23 - 29 mmol/L Final     Glucose   Date Value Ref Range Status   09/25/2024 87 70 - 110 mg/dL Final     BUN   Date Value Ref Range Status   09/25/2024 11 6 - 20 mg/dL Final     Creatinine   Date Value Ref Range Status   09/25/2024 0.8 0.5 - 1.4 mg/dL Final     Calcium   Date Value Ref Range Status   09/25/2024 9.2 8.7 - 10.5 mg/dL Final     Total Protein   Date Value Ref Range Status   09/25/2024 6.8 6.0 - 8.4 g/dL Final     Albumin   Date Value Ref Range Status   09/25/2024 4.2 3.5 - 5.2 g/dL Final     Total Bilirubin   Date Value Ref Range Status   09/25/2024 0.5 0.1 - 1.0 mg/dL Final     Comment:     For infants and newborns, interpretation of results should be based  on gestational age, weight and in agreement with clinical  observations.    Premature Infant recommended reference ranges:  Up to 24 hours.............<8.0 mg/dL  Up to 48 hours............<12.0 mg/dL  3-5 days..................<15.0 mg/dL  6-29 days.................<15.0 mg/dL       Alkaline Phosphatase   Date Value Ref Range Status   09/25/2024 66 55 - 135 U/L Final     AST   Date Value Ref Range Status   09/25/2024 21 10 - 40 U/L Final     ALT   Date Value Ref Range Status   09/25/2024 13 10 - 44 U/L Final     Anion Gap   Date Value Ref Range Status   09/25/2024 5 (L) 8 - 16  mmol/L Final     eGFR if    Date Value Ref Range Status   04/05/2021 >60.0 >60 mL/min/1.73 m^2 Final     eGFR if non    Date Value Ref Range Status   04/05/2021 >60.0 >60 mL/min/1.73 m^2 Final     Comment:     Calculation used to obtain the estimated glomerular filtration  rate (eGFR) is the CKD-EPI equation.          Lab Results   Component Value Date    [id number] 768 08/29/2023       Lab Results   Component Value Date    TSH 1.304 09/25/2024       LABORATORY EVALUATION      [id number]    CBC, CMP, TFT, B12,HA1C, Vitamin D, Zn, HIV, HCV Unremarkable          AED MONITORING      AED: OXC  RANGE: 10-35  DOSE    DATE LEVEL    06- 9 600 mg QD   09-  13 with CBC, CMP  mg QD                                      RADIOLOGY EVALUATION       NEUROPHYSIOLOGY EVALUATION       01-    EEG LT TLE      01-    EEG NL      10-    EEG  A/S NL.       PATHOLOGY EVALUATION        NEUROCOGNITIVE AND NEUROPSYCHOLOGY EVALUATION           Reviewed the neuroimaging independently       Assessment:           1. Temporal lobe epilepsy    2. Nocturnal epilepsy    3. History of dislocation of shoulder                EPILEPSY CLASSIFICATION    SEMIOLOGY:  NOCTURNAL GTC     EPILEPTOGENIC ZONE (S):  TEMPORAL LOBE (LEFT)    ETIOLOGY: UNKNOWN     PRIOR AEDS: LTG    CURRENT AEDS: OXC    LAST SEIZURE DATE: 09- (ALCOHOL, THC, SLEEP DEPRIVATION, DEHYDRATION)       COMPREHENSIVE LIST OF AEDs:     Acetazolamide (AZM-Diamox)   Benzos: clonazepam (CZP Klonopin), lorazepam (LZP-Ativan), diazepam (DZP-Valium), clorazepate (CLZ- Tranxene)  Brivaracetam (BRV-Briviact)  Cannabidiol (CBD- Epidiolex)  Carbamazepine (CBZ-Tegretol)  Cenobamate (CNB-Xcopri)  Clobazam (CLB-Onfi)  Eslicarbazepine (ESL-Aptiom)  Ethosuximide (ESX-Zarontin)  Felbamate (FBM-Felbatol)  Fenfluramine (FFA-Fintepla)  Gabapentin (GBP-Neurontin)  Lacosamide (LCM-Vimpat)  Lamotrigine (LTG-Lamitcal)  Levetiracetam (LEV-  Keppra)  Oxcarbazepine (OXC-Trileptal)  Perampanel (PML-Fycompa)  Phenobarbital (PB)  Phenytoin (PHT-Dilantin)  Pregabalin (PGB-Lyrica)  Primidone (PRM)   Retigabine (RTG- Potiga) Discontinued in 2017  Rufinamide (RFN-Benzil)  Stiripentol (STP-Diacomit)  Tiagabine (TGB-Gabitril)  Topiramate (TPM-Topamax)  Valproate (VPA-Depakote)  Vigabatrin (VGB-Sabril)  Zonisamide (ZNS-Zonegran)   Plan:             NOCTURNAL EPILEPSY, LEFT TEMPORAL    PREGNANCY PLANNING       EVALUATION     Brain MRI [facility] to evaluate for epileptogenic lesions.      MANAGEMENT      The patient was encouraged to maintain full traditional seizure precautions which include but not limited to avoidance of driving, biking, high altitudes (ladders, escalators, rock climbing, mountain climbing, skiing, eleuterio diving, moderate to difficult hiking), proximity to fire or fire source, proximity to body of water or swimming alone, operating heavy and potentially risky machinery, using sharp objects, using exercise machines like treadmill and weight lifting. Walking while accompanied on soft surfaces like grass is preferable.The patient was encouraged to shower (without accumulation of water) instead of taking a bath if unsupervised. The patient was made aware that these precautions are especially important during concurrent illnesses, fever, infections, vomiting, changing medications and running out of anti-seizure medications. Instructed the patient to avoid night shifts, sleep deprivation and alcohol or recreational drug use as adequate sleep and avoidance of alcohol/drugs are very important measures to assure good seizure control. The patient was also advised not to care for young children without company. The patient is advised to pad the side rails with pillows and blankets if applicable.I strongly recommended lowering the bed to the floor level to decrease the risk of falls during nocturnal seizures that occur during sleep. I also instructed the patient to  avoid safety sensitive duties. In general, any activity that requires full awareness and would result in serious injury to self and others if a seizure takes place should be avoided.        Made the patient aware that the duration of restrictions/precautions varies and in [place] it is 6 months. The patient verbalized  full understanding.                      Had a lengthy discussion regarding safety of AEDs during pregnancy. Studies have shown that LEV and LTG are the safest options. LEV is the best option with no drug-drug interactions, hormonal interactions and is available in IV with 1:1 IV:PO dosing. The patient wanted to make the change, so will proceed slowly.    Start LEV XR titration to 2000 mg QHS and in 2 weeks will taper OXC XR off slowly over 2 weeks before planning pregnancy.         Continue AEDs INDEFINITELY, FOR GOOD AND NEVER SKIP A DOSE. The patient verbalized full understanding. Stressed the extreme medical, personal safety, public safety and legal importance of compliance and seizure control. Will give as many refills as possible with or without face-to-face evaluation to make sure the patient and any patient with epilepsy will never run out of medications. Running out of seizure medications should never happen under our care. I explained to the patient that he should not, under any circumstances, adjust or stop taking his seizure medication without discussing with me. Warned the patient about SUDEP. The patient verbalized full understanding.     The patient was also counseled that should she get pregnant, the risk of grand mal seizures carry a higher risk on the baby than AEDs and AEDs should never be stopped or tapered off without consulting her neurologist. The patient was encouraged to ask her doctors to contact me in case of hospital admissions or surgeries[facility] before making any changes.        AVOID any activity and or substance that could lower seizure threshold including but not limited to:             SUN EXPOSURE     SLEEP DEPRIVATION     DEHYDRATION     ALCOHOL AND WITHDRAWAL      TETRAHYDROCANNABINOL (THC)        TRAMADOL.     MEPERIDINE (DEMEROL)     ALL STIMULANTS-ALL ADHD MEDICATIONS.      CLOZAPINE.      BUPROPION (WELLBUTRIN)     CIPROFLOXACIN.    CYCLOSPORINE.     METOCLOPRAMIDE (REGLAN).     KRATOM     PHOSPHODIESTERASE 5 INHIBITORS (PDE5i) ED MEDICATIONS (SILDENAFIL (VIAGRA), VARDENAFIL (LEVITRA)  , TADALAFIL (CIALIS), AVANAFIL (STENDRA)             FAMILY TEACHING ON HANDLING SEIZURES     Do not try to stop the seizure.    No not put anything is the patient's mouth.    Place on the patient's side in a safe place and keep the patient safe and away from any sharp objects.    Call 911 for seizure that lasts >3 minutes, repetitive seizures or the patient not regaining consciousness after the seizure.    Videotape the seizure if possible.                  MEDICAL/SURGICAL COMORBIDITIES     All relevant medical comorbidities noted and managed by primary care physician and medical care team.          HEALTHY LIFESTYLE AND PREVENTATIVE CARE    The patient to adhere to the age-appropriate health maintenance guidelines including screening tests and vaccinations. The patient to adhere to  healthy lifestyle, optimal weight, exercise, healthy diet, good sleep hygiene and avoiding drugs including smoking, alcohol and recreational drugs.          RTC 3 MONTHS         Snehal Payne MD, FAAN    Attending Neurologist/Epileptologist         Diplomate, American Board of Psychiatry and Neurology    Diplomate, American Board of Clinical Neurophysiology     Fellow, American Academy of Neurology         I spent a total of 43 minutes on the day of the visit.  This includes face to face time and non-face to face time preparing to see the patient (eg, review of tests), obtaining and/or reviewing separately obtained history, documenting clinical information in the electronic or other health record, independently  interpreting results and communicating results to the patient/family/caregiver, or care coordinator.

## 2025-01-15 ENCOUNTER — TELEPHONE (OUTPATIENT)
Dept: NEUROLOGY | Facility: CLINIC | Age: 30
End: 2025-01-15
Payer: COMMERCIAL

## 2025-01-15 NOTE — TELEPHONE ENCOUNTER
----- Message from Tia sent at 1/15/2025  9:18 AM CST -----  Who Called: [employer]    What is the request in detail: Requesting call back to discuss requesting MRI brain order be sent to [facility], fax [phone]. Please advise    Can the clinic reply by [facility]? No    Best Call Back Number: [phone]    Additional Information:

## 2025-01-15 NOTE — TELEPHONE ENCOUNTER
----- Message from Tia sent at 1/15/2025  9:18 AM CST -----  Who Called: [employer]    What is the request in detail: Requesting call back to discuss requesting MRI brain order be sent to [facility], fax [phone]. Please advise    Can the clinic reply by [facility]? No    Best Call Back Number: [phone]    Additional Information:

## 2025-01-17 ENCOUNTER — TELEPHONE (OUTPATIENT)
Dept: NEUROLOGY | Facility: CLINIC | Age: 30
End: 2025-01-17
Payer: COMMERCIAL

## 2025-01-17 NOTE — TELEPHONE ENCOUNTER
----- Message from Dax sent at 1/17/2025  9:07 AM CST -----  Contact: [phone]  Type:  Needs Medical Advice    Who Called: THERON WITH [employer] [phone]   Symptoms (please be specific): MRI OF THE BRAIN W/WO CONTRAST   How long has patient had these symptoms:    Pharmacy name and phone #:    Would the patient rather a call back or a response via [facility]? Call back  Best Call Back Number:  [phone]  Additional Information: [id number]...  2 ND REQUEST

## 2025-01-20 ENCOUNTER — PATIENT MESSAGE (OUTPATIENT)
Dept: NEUROLOGY | Facility: CLINIC | Age: 30
End: 2025-01-20
Payer: COMMERCIAL

## 2025-01-23 DIAGNOSIS — G40.109 TEMPORAL LOBE EPILEPSY: Primary | ICD-10-CM

## 2025-01-23 RX ORDER — LAMOTRIGINE 25 MG/1
150 TABLET ORAL 2 TIMES DAILY
Qty: 300 TABLET | Refills: 0 | Status: SHIPPED | OUTPATIENT
Start: 2025-01-23 | End: 2026-01-23

## 2025-01-24 DIAGNOSIS — G40.109 TEMPORAL LOBE EPILEPSY: Primary | ICD-10-CM

## 2025-01-24 RX ORDER — LAMOTRIGINE 25 MG/1
300 TABLET, EXTENDED RELEASE ORAL DAILY
Qty: 300 TABLET | Refills: 0 | Status: SHIPPED | OUTPATIENT
Start: 2025-01-24 | End: 2026-01-24

## 2025-03-22 ENCOUNTER — PATIENT MESSAGE (OUTPATIENT)
Dept: INTERNAL MEDICINE | Facility: CLINIC | Age: 30
End: 2025-03-22
Payer: COMMERCIAL

## 2025-03-28 ENCOUNTER — OFFICE VISIT (OUTPATIENT)
Dept: INTERNAL MEDICINE | Facility: CLINIC | Age: 30
End: 2025-03-28
Payer: COMMERCIAL

## 2025-03-28 DIAGNOSIS — G40.109 TEMPORAL LOBE EPILEPSY: ICD-10-CM

## 2025-03-28 DIAGNOSIS — F41.9 ANXIETY: Primary | ICD-10-CM

## 2025-03-28 DIAGNOSIS — R45.89 DEPRESSED MOOD: ICD-10-CM

## 2025-03-28 RX ORDER — ESCITALOPRAM OXALATE 5 MG/1
5 TABLET ORAL DAILY
Qty: 90 TABLET | Refills: 3 | Status: SHIPPED | OUTPATIENT
Start: 2025-03-28 | End: 2026-03-28

## 2025-03-28 NOTE — PROGRESS NOTES
Subjective:       Patient ID: Tammie Siddiqui is a 29 y.o. female.    Chief Complaint:  Depressed mood      The patient location is: work  The chief complaint leading to consultation is: mental health    Visit type: audiovisual    Face to Face time with patient: 10-20   minutes of total time spent on the encounter, which includes face to face time and non-face to face time preparing to see the patient (eg, review of tests), Obtaining and/or reviewing separately obtained history, Documenting clinical information in the electronic or other health record, Independently interpreting results (not separately reported) and communicating results to the patient/family/caregiver, or Care coordination (not separately reported).         Each patient to whom he or she provides medical services by telemedicine is:  (1) informed of the relationship between the physician and patient and the respective role of any other health care provider with respect to management of the patient; and (2) notified that he or she may decline to receive medical services by telemedicine and may withdraw from such care at any time.    Notes:      HPI    Problem List[1]    Past Medical History:   Diagnosis Date    Anxiety     Epilepsy     History of abnormal cervical Pap smear     Migraine        Past Surgical History:   Procedure Laterality Date    TONSILLECTOMY      WISDOM TOOTH EXTRACTION         Family History   Problem Relation Name Age of Onset    Dementia Maternal Grandmother          alzheimer's    Heart disease Paternal Grandfather Tyler McdonnellChen     Pancreatic cancer Paternal Uncle      Seizures Cousin      Alcohol abuse Father Percy Siddiuqi     Cancer Father Percy Siddiqui         Prostate    Depression Father Percy Siddiqui     Mental illness Paternal Grandmother Maddison Bonillaaux         Bipolar    Cancer Paternal Uncle Zach Siddiqui         Pancreatic       Tobacco Use History[2]    Wt Readings from Last 5 Encounters:    01/14/25 63 kg (138 lb 14.2 oz)   12/17/24 61.7 kg (136 lb)   12/09/24 62.8 kg (138 lb 7.2 oz)   10/01/24 62.3 kg (137 lb 5.6 oz)   09/24/24 62.3 kg (137 lb 5.6 oz)       For further HPI details, see assessment and plan.    Review of Systems   Constitutional:  Positive for activity change and unexpected weight change.   HENT:  Negative for hearing loss, rhinorrhea and trouble swallowing.    Eyes:  Negative for discharge and visual disturbance.   Respiratory:  Negative for chest tightness and wheezing.    Cardiovascular:  Negative for chest pain and palpitations.   Gastrointestinal:  Negative for blood in stool, constipation, diarrhea and vomiting.   Endocrine: Negative for polydipsia and polyuria.   Genitourinary:  Negative for difficulty urinating, dysuria, hematuria and menstrual problem.   Musculoskeletal:  Negative for arthralgias, joint swelling and neck pain.   Neurological:  Negative for weakness and headaches.   Psychiatric/Behavioral:  Positive for dysphoric mood. Negative for confusion.        Objective:      There were no vitals filed for this visit.    Physical Exam  Constitutional:       General: She is not in acute distress.     Appearance: She is not ill-appearing.   Pulmonary:      Effort: Pulmonary effort is normal. No respiratory distress.   Neurological:      General: No focal deficit present.      Mental Status: She is alert.   Psychiatric:         Mood and Affect: Mood normal.         Behavior: Behavior normal.         Assessment:       1. Anxiety    2. Temporal lobe epilepsy    3. Depressed mood        Plan:   Anxiety    Temporal lobe epilepsy    Depressed mood    Other orders  -     EScitalopram oxalate (LEXAPRO) 5 MG Tab; Take 1 tablet (5 mg total) by mouth once daily.  Dispense: 90 tablet; Refill: 3          History of anxiety   History of depression   History of epilepsy     Patients mood has been down.  Increased depressed mood. No si/hi Wants to get back on Lexapro.    Currently on  Lamictal.  Doing well with it.  Does worry has caused some mood disruption.  Reviewed drug interaction calculation with Clomid and Lexapro and Lamictal.  No active concerns per up-to-date drug interaction calculation.      Patient is trying to get pregnant   Discussed SSRI utilization and reviewed the reproductive considerations per up-to-date resource with patient in regards to Lexapro.  Given ongoing struggles seems reasonable.  Start Lexapro 5 mg.  Ask she updates me in 1 month.  Ask for a virtual visit in 2 months.    This note was verbally dictated, please excuse any type errors.         [1]   Patient Active Problem List  Diagnosis    History of dislocation of shoulder    Temporal lobe epilepsy    Nocturnal epilepsy   [2]   Social History  Tobacco Use   Smoking Status Never    Passive exposure: Never   Smokeless Tobacco Never

## 2025-04-09 ENCOUNTER — PATIENT MESSAGE (OUTPATIENT)
Dept: DERMATOLOGY | Facility: CLINIC | Age: 30
End: 2025-04-09
Payer: COMMERCIAL

## 2025-04-11 ENCOUNTER — PATIENT MESSAGE (OUTPATIENT)
Dept: INTERNAL MEDICINE | Facility: CLINIC | Age: 30
End: 2025-04-11
Payer: COMMERCIAL

## 2025-04-23 RX ORDER — ESCITALOPRAM OXALATE 5 MG/1
5 TABLET ORAL DAILY
Qty: 90 TABLET | Refills: 3 | Status: SHIPPED | OUTPATIENT
Start: 2025-04-23

## 2025-04-23 NOTE — TELEPHONE ENCOUNTER
No care due was identified.  [facility] Embedded Care Due Messages. Reference number: [id number].   4/22/2025 10:03:57 PM CDT

## 2025-04-23 NOTE — TELEPHONE ENCOUNTER
Refill Routing Note   Medication(s) are not appropriate for processing by [facility] for the following reason(s):        New or recently adjusted medication    [facility] action(s):  Defer               Appointments  past 12m or future 3m with PCP    Date Provider   Last Visit   3/28/2025 Mark Blakely MD   Next Visit   5/28/2025 Mark Blakely MD   ED visits in past 90 days: 0        Note composed:8:36 AM 04/23/2025

## 2025-04-30 ENCOUNTER — PATIENT MESSAGE (OUTPATIENT)
Dept: INTERNAL MEDICINE | Facility: CLINIC | Age: 30
End: 2025-04-30
Payer: COMMERCIAL

## 2025-04-30 RX ORDER — ESCITALOPRAM OXALATE 5 MG/1
10 TABLET ORAL DAILY
Qty: 180 TABLET | Refills: 3 | Status: SHIPPED | OUTPATIENT
Start: 2025-04-30

## 2025-05-12 ENCOUNTER — PATIENT MESSAGE (OUTPATIENT)
Dept: NEUROLOGY | Facility: CLINIC | Age: 30
End: 2025-05-12
Payer: COMMERCIAL

## 2025-05-12 DIAGNOSIS — G40.109 TEMPORAL LOBE EPILEPSY: ICD-10-CM

## 2025-05-12 RX ORDER — LAMOTRIGINE 150 MG/1
150 TABLET ORAL 2 TIMES DAILY
Qty: 180 TABLET | Refills: 3 | Status: SHIPPED | OUTPATIENT
Start: 2025-05-12 | End: 2025-05-14

## 2025-05-14 ENCOUNTER — TELEPHONE (OUTPATIENT)
Dept: NEUROLOGY | Facility: CLINIC | Age: 30
End: 2025-05-14
Payer: COMMERCIAL

## 2025-05-14 DIAGNOSIS — G40.909 NOCTURNAL EPILEPSY: ICD-10-CM

## 2025-05-14 DIAGNOSIS — G40.109 TEMPORAL LOBE EPILEPSY: Primary | ICD-10-CM

## 2025-05-14 RX ORDER — LEVETIRACETAM 500 MG/1
2000 TABLET, EXTENDED RELEASE ORAL NIGHTLY
Qty: 360 TABLET | Refills: 3 | Status: SHIPPED | OUTPATIENT
Start: 2025-05-14 | End: 2026-05-14

## 2025-05-14 RX ORDER — LEVETIRACETAM 500 MG/1
2000 TABLET, EXTENDED RELEASE ORAL NIGHTLY
Qty: 360 TABLET | Refills: 3 | OUTPATIENT
Start: 2025-05-14 | End: 2026-05-14

## 2025-05-14 NOTE — TELEPHONE ENCOUNTER
Spoke with pt advised her that I reached out to the pharmacy and was informed that she has been getting lamictgal 150 filled . She stated once she get home she will sent the bottles on the my chart appt .

## 2025-05-14 NOTE — TELEPHONE ENCOUNTER
----- Message from Fernanda sent at 5/14/2025  9:53 AM CDT -----  Regarding: medical advice  Contact: bernardo  Type:  Patient Returning CallWho Called: bernardo Who Left Message for Patient: nav Does the patient know what this is regarding?:Would the patient rather a call back or a response via [facility]? [facility] Call Back Number: [phone] (home)  Additional Information:

## 2025-05-23 RX ORDER — ESCITALOPRAM OXALATE 5 MG/1
10 TABLET ORAL DAILY
Qty: 180 TABLET | Refills: 3 | OUTPATIENT
Start: 2025-05-23

## 2025-05-23 NOTE — TELEPHONE ENCOUNTER
No care due was identified.  [facility] Embedded Care Due Messages. Reference number: [id number].   5/23/2025 6:19:26 AM CDT
